# Patient Record
Sex: MALE | Race: WHITE | NOT HISPANIC OR LATINO | Employment: OTHER | URBAN - METROPOLITAN AREA
[De-identification: names, ages, dates, MRNs, and addresses within clinical notes are randomized per-mention and may not be internally consistent; named-entity substitution may affect disease eponyms.]

---

## 2019-09-23 ENCOUNTER — OFFICE VISIT (OUTPATIENT)
Dept: WOUND CARE | Facility: HOSPITAL | Age: 79
End: 2019-09-23
Attending: SURGERY
Payer: MEDICARE

## 2019-09-23 VITALS
HEART RATE: 66 BPM | SYSTOLIC BLOOD PRESSURE: 168 MMHG | TEMPERATURE: 98 F | RESPIRATION RATE: 20 BRPM | DIASTOLIC BLOOD PRESSURE: 73 MMHG

## 2019-09-23 DIAGNOSIS — I87.332 CHRONIC VENOUS HYPERTENSION (IDIOPATHIC) WITH ULCER AND INFLAMMATION OF LEFT LOWER EXTREMITY: Primary | ICD-10-CM

## 2019-09-23 DIAGNOSIS — L97.322 NON-PRESSURE CHRONIC ULCER OF LEFT ANKLE WITH FAT LAYER EXPOSED: ICD-10-CM

## 2019-09-23 PROBLEM — L97.929 VENOUS ULCER OF LEFT LOWER EXTREMITY WITH VARICOSE VEINS: Status: ACTIVE | Noted: 2019-09-23

## 2019-09-23 PROBLEM — I83.029 VENOUS ULCER OF LEFT LOWER EXTREMITY WITH VARICOSE VEINS: Status: ACTIVE | Noted: 2019-09-23

## 2019-09-23 PROCEDURE — 99499 UNLISTED E&M SERVICE: CPT | Mod: ,,, | Performed by: SURGERY

## 2019-09-23 PROCEDURE — 99499 NO LOS: ICD-10-PCS | Mod: ,,, | Performed by: SURGERY

## 2019-09-23 PROCEDURE — 27201912 HC WOUND CARE DEBRIDEMENT SUPPLIES

## 2019-09-23 PROCEDURE — 99204 OFFICE O/P NEW MOD 45 MIN: CPT | Mod: 25

## 2019-09-23 PROCEDURE — 11042 DBRDMT SUBQ TIS 1ST 20SQCM/<: CPT

## 2019-09-23 RX ORDER — MULTIVITAMIN
1 TABLET ORAL DAILY
COMMUNITY

## 2019-09-23 RX ORDER — LISINOPRIL 10 MG/1
10 TABLET ORAL DAILY
COMMUNITY

## 2019-09-23 RX ORDER — CLOPIDOGREL BISULFATE 75 MG/1
75 TABLET ORAL DAILY
COMMUNITY

## 2019-09-23 RX ORDER — SIMVASTATIN 40 MG/1
40 TABLET, FILM COATED ORAL NIGHTLY
COMMUNITY

## 2019-09-23 NOTE — PROGRESS NOTES
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  Patient states the wound has been present for several weeks.  Patient says he has had previous ulcers on the right lower extremity.  Patient denies any previous history of a deep vein thrombosis.  Patient denies any lower extremity swelling. Patient says he has been putting over-the-counter cream on the wound for the past several weeks.  Patient denies any significant drainage.  He denies any significant pain fever or chills.  Patient gives a history of blockages in the lower extremities.  Patient says he had a stent placed in the right lower extremity February of 2019.  Patient does smoke daily.                 History:    Past Medical History:   Diagnosis Date    History of blood clots     right leg    Hyperlipidemia     Hypertension     Peripheral vascular disease        Past Surgical History:   Procedure Laterality Date    ANGIOPLASTY      EYE SURGERY      Left arm surgery      Right posterior leg skin graft         Family History   Problem Relation Age of Onset    Diabetes Mother     Heart disease Father     Diabetes Sister     Stroke Sister     Heart disease Brother         reports that he has been smoking cigarettes. He has been smoking about 0.25 packs per day. He has never used smokeless tobacco. He reports that he drinks alcohol. He reports that he does not use drugs.    has a current medication list which includes the following prescription(s): clopidogrel, lisinopril, multivitamin, and simvastatin.    Allergies:  Patient has no known allergies.    Review of Systems:  ROS      Vitals:    09/23/19 0905   BP: (!) 168/73   Pulse: 66   Resp: 20   Temp: 97.7 °F (36.5 °C)   TempSrc: Temporal          BMI:  There is no height or weight on file to calculate BMI.    Physical Exam:  Physical Exam  Well developed well nourished male he sitting up in bed he is in no acute distress.  Heart is regular.  Lungs are clear.  Abdomen is soft no distention hernias or masses.  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  He has numerous spider veins and some small varicosities bilaterally. He has evidence of a healed ulcer at the right medial malleolus.  He has ulceration of the left medial malleolus.  There is significant slough present.  There is no sign of infection.  Patient has no hair growth below the upper calf.  He has palpable posterior tibial pulses bilaterally.  He has no palpable dorsalis pedis pulses. Popliteal and femoral pulses are normal.  A1C:  No results for input(s): HGBA1C in the last 2160 hours.  BMP:  No results for input(s): GLU, NA, K, CL, CO2, BUN, CREATININE, CALCIUM, MG in the last 2160 hours.   CBC:  No results for input(s): WBC, RBC, HGB, HCT, PLT, MCV, MCH, MCHC in the last 2160 hours.  CMP:  No results for input(s): GLU, CALCIUM, ALBUMIN, PROT, NA, K, CO2, CL, BUN, ALKPHOS, ALT, AST, BILITOT in the last 2160 hours.    Invalid input(s): CREATININ  PREALBUMIN:  No results for input(s): PREALBUMIN in the last 2160 hours.  WOUND CULTURES:  No results for input(s): LABAERO in the last 2160 hours.        Plan:  See Wound Docs note for plan and follow up.  Will implement Santyl to the ulcer.  Will need to obtain records from [facility] regarding previous vascular intervention.  Will also order lower extremity arterial studies.  Will also or lower extremity venous duplex to evaluate for venous reflux.  Treatment plan discussed with patient.      Travis CROCKETT Marino [facility]

## 2019-09-30 ENCOUNTER — OFFICE VISIT (OUTPATIENT)
Dept: WOUND CARE | Facility: HOSPITAL | Age: 79
End: 2019-09-30
Attending: SURGERY
Payer: MEDICARE

## 2019-09-30 VITALS
TEMPERATURE: 98 F | DIASTOLIC BLOOD PRESSURE: 80 MMHG | HEART RATE: 71 BPM | SYSTOLIC BLOOD PRESSURE: 155 MMHG | RESPIRATION RATE: 18 BRPM

## 2019-09-30 DIAGNOSIS — I87.332 CHRONIC VENOUS HYPERTENSION (IDIOPATHIC) WITH ULCER AND INFLAMMATION OF LEFT LOWER EXTREMITY: ICD-10-CM

## 2019-09-30 DIAGNOSIS — L97.929 VENOUS ULCER OF LEFT LOWER EXTREMITY WITH VARICOSE VEINS: ICD-10-CM

## 2019-09-30 DIAGNOSIS — L97.322 NON-PRESSURE CHRONIC ULCER OF LEFT ANKLE WITH FAT LAYER EXPOSED: ICD-10-CM

## 2019-09-30 DIAGNOSIS — I83.029 VENOUS ULCER OF LEFT LOWER EXTREMITY WITH VARICOSE VEINS: ICD-10-CM

## 2019-09-30 PROCEDURE — 11042 DBRDMT SUBQ TIS 1ST 20SQCM/<: CPT

## 2019-09-30 PROCEDURE — 27201912 HC WOUND CARE DEBRIDEMENT SUPPLIES

## 2019-09-30 PROCEDURE — 99499 UNLISTED E&M SERVICE: CPT | Mod: ,,, | Performed by: SURGERY

## 2019-09-30 PROCEDURE — 99499 NO LOS: ICD-10-PCS | Mod: ,,, | Performed by: SURGERY

## 2019-09-30 NOTE — PROGRESS NOTES
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  Patient states the wound has been present for several weeks.  Patient says he has had previous ulcers on the right lower extremity.  Patient denies any previous history of a deep vein thrombosis.  Patient denies any lower extremity swelling. Patient says he has been putting over-the-counter cream on the wound for the past several weeks.  Patient denies any significant drainage.  He denies any significant pain fever or chills.  Patient gives a history of blockages in the lower extremities.  Patient says he had a stent placed in the right lower extremity February of 2019.  Patient does smoke daily.  SYLVIA is 0.93 on the left lower extremity.  Patient is to see Dr. Barnes for further evaluation of his lower extremity arterial circulation.  Based on his SYLVIA, anterior circulation is adequate for compression.         Current Assessment & Plan     Two layer compression initiated.         Non-pressure chronic ulcer of left ankle with fat layer exposed    Current Assessment & Plan     Debridement to remove nonviable tissue.  Continue Santyl.         Chronic venous hypertension (idiopathic) with ulcer and inflammation of left lower extremity    Overview     Patient with long-time history of chronic venous hypertension and prior venous ulcerations.  He has utilized stockings in the past but has not utilized stockings recently.               See wound doc progress notes. Documents will be scanned.        Arvin Crowe  [facility]

## 2019-10-03 ENCOUNTER — CLINICAL SUPPORT (OUTPATIENT)
Dept: WOUND CARE | Facility: HOSPITAL | Age: 79
End: 2019-10-03
Attending: SURGERY
Payer: MEDICARE

## 2019-10-03 VITALS
RESPIRATION RATE: 20 BRPM | TEMPERATURE: 98 F | SYSTOLIC BLOOD PRESSURE: 148 MMHG | DIASTOLIC BLOOD PRESSURE: 76 MMHG | HEART RATE: 74 BPM

## 2019-10-03 DIAGNOSIS — L97.322 NON-PRESSURE CHRONIC ULCER OF LEFT ANKLE WITH FAT LAYER EXPOSED: ICD-10-CM

## 2019-10-03 PROCEDURE — 29581 APPL MULTLAYER CMPRN SYS LEG: CPT | Mod: LT

## 2019-10-07 ENCOUNTER — OFFICE VISIT (OUTPATIENT)
Dept: WOUND CARE | Facility: HOSPITAL | Age: 79
End: 2019-10-07
Attending: SURGERY
Payer: MEDICARE

## 2019-10-07 VITALS — RESPIRATION RATE: 18 BRPM | SYSTOLIC BLOOD PRESSURE: 134 MMHG | HEART RATE: 75 BPM | DIASTOLIC BLOOD PRESSURE: 72 MMHG

## 2019-10-07 DIAGNOSIS — I87.332 CHRONIC VENOUS HYPERTENSION (IDIOPATHIC) WITH ULCER AND INFLAMMATION OF LEFT LOWER EXTREMITY: ICD-10-CM

## 2019-10-07 PROCEDURE — 99499 UNLISTED E&M SERVICE: CPT | Mod: ,,, | Performed by: SURGERY

## 2019-10-07 PROCEDURE — 27201912 HC WOUND CARE DEBRIDEMENT SUPPLIES

## 2019-10-07 PROCEDURE — 99499 NO LOS: ICD-10-PCS | Mod: ,,, | Performed by: SURGERY

## 2019-10-07 PROCEDURE — 11042 DBRDMT SUBQ TIS 1ST 20SQCM/<: CPT

## 2019-10-07 NOTE — PROGRESS NOTES
[facility]  Wound Care  Progress Note    Problem List Items Addressed This Visit     Chronic venous hypertension (idiopathic) with ulcer and inflammation of left lower extremity    Overview     Patient with long-time history of chronic venous hypertension and prior venous ulcerations.  He has utilized stockings in the past but has not utilized stockings recently.         Current Assessment & Plan     Wound is improved since admission to the Wound Care Center.  There is still moderate amount of slough present.  There are small areas of granulation tissue present at the wound edges.  Patient is scheduled for arterial studies along with duplex ultrasound of the lower extremity veins.  Will continue Santyl and sharp debridement is needed               See wound doc progress notes. Documents will be scanned.        Travis Galvan  [facility]

## 2019-10-07 NOTE — ASSESSMENT & PLAN NOTE
Wound is improved since admission to the Wound Care Center.  There is still moderate amount of slough present.  There are small areas of granulation tissue present at the wound edges.  Patient is scheduled for arterial studies along with duplex ultrasound of the lower extremity veins.  Will continue Santyl and sharp debridement is needed

## 2019-10-10 ENCOUNTER — CLINICAL SUPPORT (OUTPATIENT)
Dept: WOUND CARE | Facility: HOSPITAL | Age: 79
End: 2019-10-10
Attending: SURGERY
Payer: MEDICARE

## 2019-10-10 VITALS
HEART RATE: 76 BPM | DIASTOLIC BLOOD PRESSURE: 82 MMHG | RESPIRATION RATE: 18 BRPM | TEMPERATURE: 98 F | SYSTOLIC BLOOD PRESSURE: 142 MMHG

## 2019-10-10 DIAGNOSIS — L97.929 VENOUS ULCER OF LEFT LOWER EXTREMITY WITH VARICOSE VEINS: ICD-10-CM

## 2019-10-10 DIAGNOSIS — I83.029 VENOUS ULCER OF LEFT LOWER EXTREMITY WITH VARICOSE VEINS: ICD-10-CM

## 2019-10-10 PROCEDURE — 29581 APPL MULTLAYER CMPRN SYS LEG: CPT | Mod: LT

## 2019-10-14 ENCOUNTER — OFFICE VISIT (OUTPATIENT)
Dept: WOUND CARE | Facility: HOSPITAL | Age: 79
End: 2019-10-14
Attending: SURGERY
Payer: MEDICARE

## 2019-10-14 VITALS
HEART RATE: 84 BPM | RESPIRATION RATE: 20 BRPM | SYSTOLIC BLOOD PRESSURE: 127 MMHG | TEMPERATURE: 97 F | DIASTOLIC BLOOD PRESSURE: 66 MMHG

## 2019-10-14 DIAGNOSIS — I83.029 VENOUS ULCER OF LEFT LOWER EXTREMITY WITH VARICOSE VEINS: ICD-10-CM

## 2019-10-14 DIAGNOSIS — L97.322 NON-PRESSURE CHRONIC ULCER OF LEFT ANKLE WITH FAT LAYER EXPOSED: Primary | ICD-10-CM

## 2019-10-14 DIAGNOSIS — L02.416 ABSCESS OF LEFT THIGH: ICD-10-CM

## 2019-10-14 DIAGNOSIS — L97.929 VENOUS ULCER OF LEFT LOWER EXTREMITY WITH VARICOSE VEINS: ICD-10-CM

## 2019-10-14 DIAGNOSIS — I87.332 CHRONIC VENOUS HYPERTENSION (IDIOPATHIC) WITH ULCER AND INFLAMMATION OF LEFT LOWER EXTREMITY: ICD-10-CM

## 2019-10-14 PROCEDURE — 11042 DBRDMT SUBQ TIS 1ST 20SQCM/<: CPT

## 2019-10-14 PROCEDURE — 87186 SC STD MICRODIL/AGAR DIL: CPT

## 2019-10-14 PROCEDURE — 10060 I&D ABSCESS SIMPLE/SINGLE: CPT

## 2019-10-14 PROCEDURE — 87075 CULTR BACTERIA EXCEPT BLOOD: CPT

## 2019-10-14 PROCEDURE — 87077 CULTURE AEROBIC IDENTIFY: CPT

## 2019-10-14 PROCEDURE — 27201912 HC WOUND CARE DEBRIDEMENT SUPPLIES

## 2019-10-14 PROCEDURE — 99499 UNLISTED E&M SERVICE: CPT | Mod: ,,, | Performed by: SURGERY

## 2019-10-14 PROCEDURE — 87070 CULTURE OTHR SPECIMN AEROBIC: CPT

## 2019-10-14 PROCEDURE — 99499 NO LOS: ICD-10-PCS | Mod: ,,, | Performed by: SURGERY

## 2019-10-14 NOTE — ASSESSMENT & PLAN NOTE
Incision and drainage was performed.  Packing placed. Culture was taken.  Begin Bactrim DS b.i.d. for 10 days.  MRSA is suspected.

## 2019-10-14 NOTE — ASSESSMENT & PLAN NOTE
Periwound is macerated.  Continue debridement to maintain active phase wound healing.  Continue compression.  Discontinue Santyl.

## 2019-10-17 ENCOUNTER — CLINICAL SUPPORT (OUTPATIENT)
Dept: WOUND CARE | Facility: HOSPITAL | Age: 79
End: 2019-10-17
Attending: SURGERY
Payer: MEDICARE

## 2019-10-17 VITALS
TEMPERATURE: 98 F | RESPIRATION RATE: 18 BRPM | DIASTOLIC BLOOD PRESSURE: 84 MMHG | HEART RATE: 88 BPM | SYSTOLIC BLOOD PRESSURE: 130 MMHG

## 2019-10-17 DIAGNOSIS — I87.332 CHRONIC VENOUS HYPERTENSION (IDIOPATHIC) WITH ULCER AND INFLAMMATION OF LEFT LOWER EXTREMITY: ICD-10-CM

## 2019-10-17 LAB — BACTERIA SPEC AEROBE CULT: ABNORMAL

## 2019-10-17 PROCEDURE — 29581 APPL MULTLAYER CMPRN SYS LEG: CPT | Mod: LT

## 2019-10-18 NOTE — PROGRESS NOTES
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  Patient states the wound has been present for several weeks.  Patient says he has had previous ulcers on the right lower extremity.  Patient denies any previous history of a deep vein thrombosis.  Patient denies any lower extremity swelling. Patient says he has been putting over-the-counter cream on the wound for the past several weeks.  Patient denies any significant drainage.  He denies any significant pain fever or chills.  Patient gives a history of blockages in the lower extremities.  Patient says he had a stent placed in the right lower extremity February of 2019.  Patient does smoke daily.  SYLVIA is 0.93 on the left lower extremity.  Patient is to see Dr. Barnes for further evaluation of his lower extremity arterial circulation.  Based on his SYLVIA, anterior circulation is adequate for compression.         Non-pressure chronic ulcer of left ankle with fat layer exposed - Primary    Current Assessment & Plan     Periwound is macerated.  Continue debridement to maintain active phase wound healing.  Continue compression.  Discontinue Santyl.             Chronic venous hypertension (idiopathic) with ulcer and inflammation of left lower extremity    Overview     Patient with long-time history of chronic venous hypertension and prior venous ulcerations.  He has utilized stockings in the past but has not utilized stockings recently.            ID    Abscess of left thigh    Overview     Patient presented 10/14/2019 with a several day history of a red tender area on the left anterior thigh.  There has been slight drainage.   There was a 3 x 3 cm area of erythema and tenderness of the left anterior thigh.  Incision  and drainage was performed.           Current Assessment & Plan     Incision and drainage was performed.  Packing placed. Culture was taken.  Begin Bactrim DS b.i.d. for 10 days.  MRSA is suspected.         Relevant Orders    CULTURE, AEROBIC  (SPECIFY SOURCE) (Completed)    CULTURE, ANAEROBE (Completed)          See wound doc progress notes. Documents will be scanned.        Arvin CHU Hebert [facility]

## 2019-10-21 ENCOUNTER — OFFICE VISIT (OUTPATIENT)
Dept: WOUND CARE | Facility: HOSPITAL | Age: 79
End: 2019-10-21
Attending: SURGERY
Payer: MEDICARE

## 2019-10-21 VITALS
RESPIRATION RATE: 20 BRPM | TEMPERATURE: 98 F | HEART RATE: 85 BPM | DIASTOLIC BLOOD PRESSURE: 67 MMHG | SYSTOLIC BLOOD PRESSURE: 133 MMHG

## 2019-10-21 DIAGNOSIS — L02.416 ABSCESS OF LEFT THIGH: ICD-10-CM

## 2019-10-21 DIAGNOSIS — L97.929 VENOUS ULCER OF LEFT LOWER EXTREMITY WITH VARICOSE VEINS: ICD-10-CM

## 2019-10-21 DIAGNOSIS — I83.029 VENOUS ULCER OF LEFT LOWER EXTREMITY WITH VARICOSE VEINS: ICD-10-CM

## 2019-10-21 LAB — BACTERIA SPEC ANAEROBE CULT: NORMAL

## 2019-10-21 PROCEDURE — 99499 UNLISTED E&M SERVICE: CPT | Mod: ,,, | Performed by: SURGERY

## 2019-10-21 PROCEDURE — 99499 NO LOS: ICD-10-PCS | Mod: ,,, | Performed by: SURGERY

## 2019-10-21 PROCEDURE — 27201912 HC WOUND CARE DEBRIDEMENT SUPPLIES

## 2019-10-21 PROCEDURE — 11042 DBRDMT SUBQ TIS 1ST 20SQCM/<: CPT

## 2019-10-21 NOTE — ASSESSMENT & PLAN NOTE
Wound consists mostly of slough.  There are few areas of granulation tissue.  Wound does bleed with debridement.  Continue Santyl for enzymatic debridement.  Continue sharp debridement is needed.  Continue compression therapy.

## 2019-10-21 NOTE — PROGRESS NOTES
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  Patient states the wound has been present for several weeks.  Patient says he has had previous ulcers on the right lower extremity.  Patient denies any previous history of a deep vein thrombosis.  Patient denies any lower extremity swelling. Patient says he has been putting over-the-counter cream on the wound for the past several weeks.  Patient denies any significant drainage.  He denies any significant pain fever or chills.  Patient gives a history of blockages in the lower extremities.  Patient says he had a stent placed in the right lower extremity February of 2019.  Patient does smoke daily.  SYLVIA is 0.93 on the left lower extremity.  Patient is to see Dr. Barnes for further evaluation of his lower extremity arterial circulation.  Based on his SYLVIA, anterior circulation is adequate for compression.         Current Assessment & Plan     Wound consists mostly of slough.  There are few areas of granulation tissue.  Wound does bleed with debridement.  Continue Santyl for enzymatic debridement.  Continue sharp debridement is needed.  Continue compression therapy.         Abscess of left thigh    Overview     Patient presented 10/14/2019 with a several day history of a red tender area on the left anterior thigh.  There has been slight drainage.   There was a 3 x 3 cm area of erythema and tenderness of the left anterior thigh.  Incision and drainage was performed.           Current Assessment & Plan     No sign of soft tissue infection.  Continue current wound management               See wound doc progress notes. Documents will be scanned.        Travis Galvan  [facility]

## 2019-10-24 ENCOUNTER — CLINICAL SUPPORT (OUTPATIENT)
Dept: WOUND CARE | Facility: HOSPITAL | Age: 79
End: 2019-10-24
Attending: SURGERY
Payer: MEDICARE

## 2019-10-24 VITALS
RESPIRATION RATE: 20 BRPM | HEART RATE: 63 BPM | DIASTOLIC BLOOD PRESSURE: 70 MMHG | TEMPERATURE: 98 F | SYSTOLIC BLOOD PRESSURE: 121 MMHG

## 2019-10-24 DIAGNOSIS — I87.332 CHRONIC VENOUS HYPERTENSION (IDIOPATHIC) WITH ULCER AND INFLAMMATION OF LEFT LOWER EXTREMITY: Primary | ICD-10-CM

## 2019-10-24 PROCEDURE — 29581 APPL MULTLAYER CMPRN SYS LEG: CPT | Mod: LT

## 2019-10-28 ENCOUNTER — OFFICE VISIT (OUTPATIENT)
Dept: WOUND CARE | Facility: HOSPITAL | Age: 79
End: 2019-10-28
Attending: SURGERY
Payer: MEDICARE

## 2019-10-28 VITALS
DIASTOLIC BLOOD PRESSURE: 66 MMHG | SYSTOLIC BLOOD PRESSURE: 134 MMHG | RESPIRATION RATE: 20 BRPM | TEMPERATURE: 98 F | HEART RATE: 90 BPM

## 2019-10-28 DIAGNOSIS — A49.02 MRSA (METHICILLIN RESISTANT STAPHYLOCOCCUS AUREUS) INFECTION: ICD-10-CM

## 2019-10-28 DIAGNOSIS — L97.322 NON-PRESSURE CHRONIC ULCER OF LEFT ANKLE WITH FAT LAYER EXPOSED: ICD-10-CM

## 2019-10-28 DIAGNOSIS — L02.416 ABSCESS OF LEFT THIGH: ICD-10-CM

## 2019-10-28 DIAGNOSIS — I83.029 VENOUS ULCER OF LEFT LOWER EXTREMITY WITH VARICOSE VEINS: Primary | ICD-10-CM

## 2019-10-28 DIAGNOSIS — L97.929 VENOUS ULCER OF LEFT LOWER EXTREMITY WITH VARICOSE VEINS: Primary | ICD-10-CM

## 2019-10-28 DIAGNOSIS — I87.332 CHRONIC VENOUS HYPERTENSION (IDIOPATHIC) WITH ULCER AND INFLAMMATION OF LEFT LOWER EXTREMITY: ICD-10-CM

## 2019-10-28 PROCEDURE — 11042 DBRDMT SUBQ TIS 1ST 20SQCM/<: CPT

## 2019-10-28 PROCEDURE — 99499 UNLISTED E&M SERVICE: CPT | Mod: ,,, | Performed by: SURGERY

## 2019-10-28 PROCEDURE — 99499 NO LOS: ICD-10-PCS | Mod: ,,, | Performed by: SURGERY

## 2019-10-28 PROCEDURE — 27201912 HC WOUND CARE DEBRIDEMENT SUPPLIES

## 2019-10-28 NOTE — ASSESSMENT & PLAN NOTE
Continue compression.  Will try compressing with the patient's prescribe stocking and monitor the wound closely.

## 2019-10-28 NOTE — ASSESSMENT & PLAN NOTE
Wound is improving.  It is decreased in size.  Continue debridement to remove nonviable tissue.  Continue Santyl.

## 2019-10-28 NOTE — PROGRESS NOTES
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  Patient states the wound has been present for several weeks.  Patient says he has had previous ulcers on the right lower extremity.  Patient denies any previous history of a deep vein thrombosis.  Patient denies any lower extremity swelling. Patient says he has been putting over-the-counter cream on the wound for the past several weeks.  Patient denies any significant drainage.  He denies any significant pain fever or chills.  Patient gives a history of blockages in the lower extremities.  Patient says he had a stent placed in the right lower extremity February of 2019.  Patient does smoke daily.  SYLVIA is 0.93 on the left lower extremity.  Patient is to see Dr. Barnes for further evaluation of his lower extremity arterial circulation.  Based on his SYLVIA, anterior circulation is adequate for compression.         Current Assessment & Plan     Continue compression.  Will try compressing with the patient's prescribe stocking and monitor the wound closely.         Non-pressure chronic ulcer of left ankle with fat layer exposed    Current Assessment & Plan     Wound is improving.  It is decreased in size.  Continue debridement to remove nonviable tissue.  Continue Santyl.         Chronic venous hypertension (idiopathic) with ulcer and inflammation of left lower extremity    Overview     Patient with long-time history of chronic venous hypertension and prior venous ulcerations.  He has utilized stockings in the past but has not utilized stockings recently.            ID    Abscess of left thigh    Overview     Patient presented 10/14/2019 with a several day history of a red tender area on the left anterior  thigh.  There has been slight drainage.   There was a 3 x 3 cm area of erythema and tenderness of the left anterior thigh.  Incision and drainage was performed.           Current Assessment & Plan     Wound is improving.  No sign of infection.         MRSA (methicillin resistant Staphylococcus aureus) infection    Overview     Left thigh abscess revealed MRSA.  He has been treated with Bactrim.         Current Assessment & Plan     Infection resolved               See wound doc progress notes. Documents will be scanned.        Arvin CHU Hebert [facility]

## 2019-11-04 ENCOUNTER — OFFICE VISIT (OUTPATIENT)
Dept: WOUND CARE | Facility: HOSPITAL | Age: 79
End: 2019-11-04
Attending: SURGERY
Payer: MEDICARE

## 2019-11-04 VITALS
TEMPERATURE: 98 F | RESPIRATION RATE: 18 BRPM | DIASTOLIC BLOOD PRESSURE: 60 MMHG | HEART RATE: 84 BPM | SYSTOLIC BLOOD PRESSURE: 119 MMHG

## 2019-11-04 DIAGNOSIS — I87.332 CHRONIC VENOUS HYPERTENSION (IDIOPATHIC) WITH ULCER AND INFLAMMATION OF LEFT LOWER EXTREMITY: ICD-10-CM

## 2019-11-04 DIAGNOSIS — L02.416 ABSCESS OF LEFT THIGH: ICD-10-CM

## 2019-11-04 PROCEDURE — 27201912 HC WOUND CARE DEBRIDEMENT SUPPLIES

## 2019-11-04 PROCEDURE — 11042 DBRDMT SUBQ TIS 1ST 20SQCM/<: CPT

## 2019-11-04 PROCEDURE — 99499 UNLISTED E&M SERVICE: CPT | Mod: ,,, | Performed by: SURGERY

## 2019-11-04 PROCEDURE — 99499 NO LOS: ICD-10-PCS | Mod: ,,, | Performed by: SURGERY

## 2019-11-04 NOTE — PROGRESS NOTES
[facility]  Wound Care  Progress Note    Problem List Items Addressed This Visit     Chronic venous hypertension (idiopathic) with ulcer and inflammation of left lower extremity    Overview     Patient with long-time history of chronic venous hypertension and prior venous ulcerations.  He has utilized stockings in the past but has not utilized stockings recently.         Current Assessment & Plan     Wound improved.  Islands of granulation tissue. There is still fair amount of slough and nonviable tissue present.  Patient had ultrasound and lab work performed last week.  Will obtain results.  Continue current management.         Abscess of left thigh    Overview     Patient presented 10/14/2019 with a several day history of a red tender area on the left anterior thigh.  There has been slight drainage.   There was a 3 x 3 cm area of erythema and tenderness of the left anterior thigh.  Incision and drainage was performed at that time.           Current Assessment & Plan     Wound nearly resolved.               See wound doc progress notes. Documents will be scanned.        Travis Galvan  [facility]

## 2019-11-04 NOTE — ASSESSMENT & PLAN NOTE
Wound improved.  Islands of granulation tissue. There is still fair amount of slough and nonviable tissue present.  Patient had ultrasound and lab work performed last week.  Will obtain results.  Continue current management.

## 2019-11-11 ENCOUNTER — OFFICE VISIT (OUTPATIENT)
Dept: WOUND CARE | Facility: HOSPITAL | Age: 79
End: 2019-11-11
Attending: SURGERY
Payer: MEDICARE

## 2019-11-11 VITALS
SYSTOLIC BLOOD PRESSURE: 145 MMHG | RESPIRATION RATE: 20 BRPM | HEART RATE: 68 BPM | TEMPERATURE: 98 F | DIASTOLIC BLOOD PRESSURE: 73 MMHG

## 2019-11-11 DIAGNOSIS — L97.322 NON-PRESSURE CHRONIC ULCER OF LEFT ANKLE WITH FAT LAYER EXPOSED: Primary | ICD-10-CM

## 2019-11-11 DIAGNOSIS — L02.416 ABSCESS OF LEFT THIGH: ICD-10-CM

## 2019-11-11 DIAGNOSIS — I83.029 VENOUS ULCER OF LEFT LOWER EXTREMITY WITH VARICOSE VEINS: ICD-10-CM

## 2019-11-11 DIAGNOSIS — L97.929 VENOUS ULCER OF LEFT LOWER EXTREMITY WITH VARICOSE VEINS: ICD-10-CM

## 2019-11-11 DIAGNOSIS — L97.122 NON-PRESSURE CHRONIC ULCER OF LEFT THIGH WITH FAT LAYER EXPOSED: ICD-10-CM

## 2019-11-11 DIAGNOSIS — I87.332 CHRONIC VENOUS HYPERTENSION (IDIOPATHIC) WITH ULCER AND INFLAMMATION OF LEFT LOWER EXTREMITY: ICD-10-CM

## 2019-11-11 PROCEDURE — 11042 DBRDMT SUBQ TIS 1ST 20SQCM/<: CPT

## 2019-11-11 PROCEDURE — 99499 NO LOS: ICD-10-PCS | Mod: ,,, | Performed by: SURGERY

## 2019-11-11 PROCEDURE — 27201912 HC WOUND CARE DEBRIDEMENT SUPPLIES

## 2019-11-11 PROCEDURE — 99499 UNLISTED E&M SERVICE: CPT | Mod: ,,, | Performed by: SURGERY

## 2019-11-11 NOTE — ASSESSMENT & PLAN NOTE
Patient has increased dermatitis in the periwound region.  Begin clobetasol.  Continue stocking use to control edema.

## 2019-11-11 NOTE — PROGRESS NOTES
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  Patient states the wound has been present for several weeks.  Patient says he has had previous ulcers on the right lower extremity.  Patient denies any previous history of a deep vein thrombosis.  Patient denies any lower extremity swelling. Patient says he has been putting over-the-counter cream on the wound for the past several weeks.  Patient denies any significant drainage.  He denies any significant pain fever or chills.  Patient gives a history of blockages in the lower extremities.  Patient says he had a stent placed in the right lower extremity February of 2019.  Patient does smoke daily.  SYLVIA is 0.93 on the left lower extremity. Based on his SYLVIA, anterior circulation is adequate for compression.         Non-pressure chronic ulcer of left ankle with fat layer exposed - Primary    Chronic venous hypertension (idiopathic) with ulcer and inflammation of left lower extremity    Overview     Patient with long-time history of chronic venous hypertension and prior venous ulcerations.  He has utilized stockings in the past but has not utilized stockings recently.         Current Assessment & Plan     Patient has increased dermatitis in the periwound region.  Begin clobetasol.  Continue stocking use to control edema.         Non-pressure chronic ulcer of left thigh with fat layer exposed    Overview     Patient noted a wound on his left posterior thigh 2 weeks ago from 11/11/2019 when he noted pain when sitting on a bench.  At the time, he noted a wet area and subsequent wound. He denies burning his skin.  He has not really sure of the origin.  He has some tenderness when the area is touched.         Current  Assessment & Plan     Wound had small amount of nonviable tissue.  Debridement was performed.  Begin Mepilex Ag foam.            ID    Abscess of left thigh    Overview     Patient presented 10/14/2019 with a several day history of a red tender area on the left anterior thigh.  There has been slight drainage.   There was a 3 x 3 cm area of erythema and tenderness of the left anterior thigh.  Incision and drainage was performed at that time.           Current Assessment & Plan     Abscess resolved and wound healed.               See wound doc progress notes. Documents will be scanned.        Arvin Crowe  [facility]

## 2019-11-18 ENCOUNTER — OFFICE VISIT (OUTPATIENT)
Dept: WOUND CARE | Facility: HOSPITAL | Age: 79
End: 2019-11-18
Attending: SURGERY
Payer: MEDICARE

## 2019-11-18 VITALS — DIASTOLIC BLOOD PRESSURE: 88 MMHG | SYSTOLIC BLOOD PRESSURE: 134 MMHG | RESPIRATION RATE: 20 BRPM | HEART RATE: 70 BPM

## 2019-11-18 DIAGNOSIS — I87.332 CHRONIC VENOUS HYPERTENSION (IDIOPATHIC) WITH ULCER AND INFLAMMATION OF LEFT LOWER EXTREMITY: ICD-10-CM

## 2019-11-18 PROCEDURE — 99499 UNLISTED E&M SERVICE: CPT | Mod: ,,, | Performed by: SURGERY

## 2019-11-18 PROCEDURE — 27201912 HC WOUND CARE DEBRIDEMENT SUPPLIES

## 2019-11-18 PROCEDURE — 99499 NO LOS: ICD-10-PCS | Mod: ,,, | Performed by: SURGERY

## 2019-11-18 PROCEDURE — 11042 DBRDMT SUBQ TIS 1ST 20SQCM/<: CPT

## 2019-11-18 NOTE — PROGRESS NOTES
[facility]  Wound Care  Progress Note    Problem List Items Addressed This Visit     Chronic venous hypertension (idiopathic) with ulcer and inflammation of left lower extremity    Overview     Patient with long-time history of chronic venous hypertension and prior venous ulcerations.  He has utilized stockings in the past but has not utilized stockings recently.         Current Assessment & Plan     Decreased drainage. Edema control.  Wound bed starting to granulate.  Continue current management               See wound doc progress notes. Documents will be scanned.        Travis Galvan  [facility]

## 2019-11-25 ENCOUNTER — OFFICE VISIT (OUTPATIENT)
Dept: WOUND CARE | Facility: HOSPITAL | Age: 79
End: 2019-11-25
Attending: SURGERY
Payer: MEDICARE

## 2019-11-25 VITALS
DIASTOLIC BLOOD PRESSURE: 68 MMHG | SYSTOLIC BLOOD PRESSURE: 122 MMHG | RESPIRATION RATE: 20 BRPM | TEMPERATURE: 98 F | HEART RATE: 74 BPM

## 2019-11-25 DIAGNOSIS — I83.029 VENOUS ULCER OF LEFT LOWER EXTREMITY WITH VARICOSE VEINS: ICD-10-CM

## 2019-11-25 DIAGNOSIS — L97.929 VENOUS ULCER OF LEFT LOWER EXTREMITY WITH VARICOSE VEINS: ICD-10-CM

## 2019-11-25 DIAGNOSIS — L97.122 NON-PRESSURE CHRONIC ULCER OF LEFT THIGH WITH FAT LAYER EXPOSED: ICD-10-CM

## 2019-11-25 DIAGNOSIS — L97.322 NON-PRESSURE CHRONIC ULCER OF LEFT ANKLE WITH FAT LAYER EXPOSED: Primary | ICD-10-CM

## 2019-11-25 DIAGNOSIS — I87.332 CHRONIC VENOUS HYPERTENSION (IDIOPATHIC) WITH ULCER AND INFLAMMATION OF LEFT LOWER EXTREMITY: ICD-10-CM

## 2019-11-25 PROBLEM — A49.02 MRSA (METHICILLIN RESISTANT STAPHYLOCOCCUS AUREUS) INFECTION: Status: RESOLVED | Noted: 2019-10-28 | Resolved: 2019-11-25

## 2019-11-25 PROBLEM — L02.416 ABSCESS OF LEFT THIGH: Status: RESOLVED | Noted: 2019-10-14 | Resolved: 2019-11-25

## 2019-11-25 PROCEDURE — 99499 UNLISTED E&M SERVICE: CPT | Mod: ,,, | Performed by: SURGERY

## 2019-11-25 PROCEDURE — 27201912 HC WOUND CARE DEBRIDEMENT SUPPLIES

## 2019-11-25 PROCEDURE — 99499 NO LOS: ICD-10-PCS | Mod: ,,, | Performed by: SURGERY

## 2019-11-25 PROCEDURE — 11042 DBRDMT SUBQ TIS 1ST 20SQCM/<: CPT

## 2019-11-25 NOTE — ASSESSMENT & PLAN NOTE
Wound has worsened.  Continue debridement to maintain active phase wound healing.  Continue clobetasol a miesha wound area.  Increased compression with 2 layer as stocking does not appear to be adequate.

## 2019-11-25 NOTE — PROGRESS NOTES
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  Patient states the wound has been present for several weeks.  Patient says he has had previous ulcers on the right lower extremity.  Patient denies any previous history of a deep vein thrombosis.  Patient denies any lower extremity swelling. Patient says he has been putting over-the-counter cream on the wound for the past several weeks.  Patient denies any significant drainage.  He denies any significant pain fever or chills.  Patient gives a history of blockages in the lower extremities.  Patient says he had a stent placed in the right lower extremity February of 2019.  Patient does smoke daily.  SYLVIA is 0.93 on the left lower extremity. Based on his SYLVIA, anterior circulation is adequate for compression.         Non-pressure chronic ulcer of left ankle with fat layer exposed - Primary    Current Assessment & Plan     Wound has worsened.  Continue debridement to maintain active phase wound healing.  Continue clobetasol a miesha wound area.  Increased compression with 2 layer as stocking does not appear to be adequate.         Chronic venous hypertension (idiopathic) with ulcer and inflammation of left lower extremity    Overview     Patient with long-time history of chronic venous hypertension and prior venous ulcerations.  He has utilized stockings in the past but has not utilized stockings recently.         Non-pressure chronic ulcer of left thigh with fat layer exposed    Overview     Patient noted a wound on his left posterior thigh 2 weeks ago from 11/11/2019 when he noted pain when sitting on a bench.  At the time, he noted a wet area and subsequent wound. He denies burning his skin.  He has not really sure  of the origin.  He has some tenderness when the area is touched.         Current Assessment & Plan     Wound is improving.  Continue debridement to maintain active phase wound healing.               See wound doc progress notes. Documents will be scanned.        Arvin Crowe  [facility]

## 2019-12-02 ENCOUNTER — OFFICE VISIT (OUTPATIENT)
Dept: WOUND CARE | Facility: HOSPITAL | Age: 79
End: 2019-12-02
Attending: SURGERY
Payer: MEDICARE

## 2019-12-02 VITALS
TEMPERATURE: 97 F | RESPIRATION RATE: 18 BRPM | HEART RATE: 88 BPM | SYSTOLIC BLOOD PRESSURE: 159 MMHG | DIASTOLIC BLOOD PRESSURE: 60 MMHG

## 2019-12-02 DIAGNOSIS — L97.322 NON-PRESSURE CHRONIC ULCER OF LEFT ANKLE WITH FAT LAYER EXPOSED: ICD-10-CM

## 2019-12-02 PROCEDURE — 27201912 HC WOUND CARE DEBRIDEMENT SUPPLIES

## 2019-12-02 PROCEDURE — 99499 UNLISTED E&M SERVICE: CPT | Mod: ,,, | Performed by: SURGERY

## 2019-12-02 PROCEDURE — 11042 DBRDMT SUBQ TIS 1ST 20SQCM/<: CPT

## 2019-12-02 PROCEDURE — 99499 NO LOS: ICD-10-PCS | Mod: ,,, | Performed by: SURGERY

## 2019-12-02 NOTE — PROGRESS NOTES
[facility]  Wound Care  Progress Note    Problem List Items Addressed This Visit     Non-pressure chronic ulcer of left ankle with fat layer exposed    Current Assessment & Plan     Wound is increased in size by measurements but there is significantly more granulation tissue present.  There is still fair amount of fibrous nonviable tissue present.  Patient states that the wound is very painful.  He could not tolerate the 2 layer compression and took it off.  Edema is fairly well controlled even without the to wear compression for the past several days.  Will go back to [facility] and try a compression stocking for edema control               See wound doc progress notes. Documents will be scanned.        Travis Galvan  [facility]

## 2019-12-02 NOTE — ASSESSMENT & PLAN NOTE
Wound is increased in size by measurements but there is significantly more granulation tissue present.  There is still fair amount of fibrous nonviable tissue present.  Patient states that the wound is very painful.  He could not tolerate the 2 layer compression and took it off.  Edema is fairly well controlled even without the to wear compression for the past several days.  Will go back to [facility] and try a compression stocking for edema control

## 2019-12-09 ENCOUNTER — OFFICE VISIT (OUTPATIENT)
Dept: WOUND CARE | Facility: HOSPITAL | Age: 79
End: 2019-12-09
Attending: SURGERY
Payer: MEDICARE

## 2019-12-09 VITALS
SYSTOLIC BLOOD PRESSURE: 137 MMHG | DIASTOLIC BLOOD PRESSURE: 78 MMHG | TEMPERATURE: 98 F | RESPIRATION RATE: 18 BRPM | HEART RATE: 83 BPM

## 2019-12-09 DIAGNOSIS — I83.029 VENOUS ULCER OF LEFT LOWER EXTREMITY WITH VARICOSE VEINS: ICD-10-CM

## 2019-12-09 DIAGNOSIS — L97.322 NON-PRESSURE CHRONIC ULCER OF LEFT ANKLE WITH FAT LAYER EXPOSED: Primary | ICD-10-CM

## 2019-12-09 DIAGNOSIS — L97.929 VENOUS ULCER OF LEFT LOWER EXTREMITY WITH VARICOSE VEINS: ICD-10-CM

## 2019-12-09 DIAGNOSIS — I87.332 CHRONIC VENOUS HYPERTENSION (IDIOPATHIC) WITH ULCER AND INFLAMMATION OF LEFT LOWER EXTREMITY: ICD-10-CM

## 2019-12-09 PROBLEM — L97.122 NON-PRESSURE CHRONIC ULCER OF LEFT THIGH WITH FAT LAYER EXPOSED: Status: RESOLVED | Noted: 2019-11-11 | Resolved: 2019-12-09

## 2019-12-09 PROCEDURE — 27201912 HC WOUND CARE DEBRIDEMENT SUPPLIES

## 2019-12-09 PROCEDURE — 11042 DBRDMT SUBQ TIS 1ST 20SQCM/<: CPT

## 2019-12-09 PROCEDURE — 99499 NO LOS: ICD-10-PCS | Mod: ,,, | Performed by: SURGERY

## 2019-12-09 PROCEDURE — 99499 UNLISTED E&M SERVICE: CPT | Mod: ,,, | Performed by: SURGERY

## 2019-12-09 NOTE — ASSESSMENT & PLAN NOTE
Wound has worsened.  Continue debridement to maintain active phase wound healing.  Re-initiate compression.

## 2019-12-09 NOTE — PROGRESS NOTES
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  Patient states the wound has been present for several weeks.  Patient says he has had previous ulcers on the right lower extremity.  Patient denies any previous history of a deep vein thrombosis.  Patient denies any lower extremity swelling. Patient says he has been putting over-the-counter cream on the wound for the past several weeks.  Patient denies any significant drainage.  He denies any significant pain fever or chills.  Patient gives a history of blockages in the lower extremities.  Patient says he had a stent placed in the right lower extremity February of 2019.  Patient does smoke daily.  SYLVIA is 0.93 on the left lower extremity. Based on his SYLVIA, anterior circulation is adequate for compression.         Non-pressure chronic ulcer of left ankle with fat layer exposed - Primary    Current Assessment & Plan     Wound has worsened.  Continue debridement to maintain active phase wound healing.  Re-initiate compression.         Chronic venous hypertension (idiopathic) with ulcer and inflammation of left lower extremity    Overview     Patient with long-time history of chronic venous hypertension and prior venous ulcerations.  He has utilized stockings in the past but has not utilized stockings recently.         Current Assessment & Plan     Reinitiate compression therapy with 2 layers.               See wound doc progress notes. Documents will be scanned.        Arvin Crowe  [facility]

## 2019-12-12 ENCOUNTER — CLINICAL SUPPORT (OUTPATIENT)
Dept: WOUND CARE | Facility: HOSPITAL | Age: 79
End: 2019-12-12
Attending: SURGERY
Payer: MEDICARE

## 2019-12-12 VITALS
HEART RATE: 71 BPM | SYSTOLIC BLOOD PRESSURE: 143 MMHG | TEMPERATURE: 98 F | DIASTOLIC BLOOD PRESSURE: 78 MMHG | RESPIRATION RATE: 16 BRPM

## 2019-12-12 DIAGNOSIS — I83.029 VENOUS ULCER OF LEFT LOWER EXTREMITY WITH VARICOSE VEINS: ICD-10-CM

## 2019-12-12 DIAGNOSIS — L97.929 VENOUS ULCER OF LEFT LOWER EXTREMITY WITH VARICOSE VEINS: ICD-10-CM

## 2019-12-12 DIAGNOSIS — L97.322 NON-PRESSURE CHRONIC ULCER OF LEFT ANKLE WITH FAT LAYER EXPOSED: ICD-10-CM

## 2019-12-12 PROCEDURE — 29581 APPL MULTLAYER CMPRN SYS LEG: CPT | Mod: LT

## 2019-12-16 ENCOUNTER — OFFICE VISIT (OUTPATIENT)
Dept: WOUND CARE | Facility: HOSPITAL | Age: 79
End: 2019-12-16
Attending: SURGERY
Payer: MEDICARE

## 2019-12-16 DIAGNOSIS — I83.029 VENOUS ULCER OF LEFT LOWER EXTREMITY WITH VARICOSE VEINS: ICD-10-CM

## 2019-12-16 DIAGNOSIS — L97.929 VENOUS ULCER OF LEFT LOWER EXTREMITY WITH VARICOSE VEINS: ICD-10-CM

## 2019-12-16 PROCEDURE — 99499 UNLISTED E&M SERVICE: CPT | Mod: ,,, | Performed by: SURGERY

## 2019-12-16 PROCEDURE — 11042 DBRDMT SUBQ TIS 1ST 20SQCM/<: CPT

## 2019-12-16 PROCEDURE — 27201912 HC WOUND CARE DEBRIDEMENT SUPPLIES

## 2019-12-16 PROCEDURE — 99499 NO LOS: ICD-10-PCS | Mod: ,,, | Performed by: SURGERY

## 2019-12-16 NOTE — PROGRESS NOTES
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  Patient states the wound has been present for several weeks.  Patient says he has had previous ulcers on the right lower extremity.  Patient denies any previous history of a deep vein thrombosis.  Patient denies any lower extremity swelling. Patient says he has been putting over-the-counter cream on the wound for the past several weeks.  Patient denies any significant drainage.  He denies any significant pain fever or chills.  Patient gives a history of blockages in the lower extremities.  Patient says he had a stent placed in the right lower extremity February of 2019.  Patient does smoke daily.  SYLVIA is 0.93 on the left lower extremity. Based on his SYLVIA, anterior circulation is adequate for compression.         Current Assessment & Plan     Drainage is decreased today.  Edema is fairly well controlled.  Wound bed with a moderate amount of slough and drainage.  Will change to silver alginate.  Continue compression.  Patient has appointment with [facility] this week for intervention               See wound doc progress notes. Documents will be scanned.        Travis Galvan  [facility]

## 2019-12-16 NOTE — ASSESSMENT & PLAN NOTE
Drainage is decreased today.  Edema is fairly well controlled.  Wound bed with a moderate amount of slough and drainage.  Will change to silver alginate.  Continue compression.  Patient has appointment with [facility] this week for intervention

## 2019-12-23 ENCOUNTER — OFFICE VISIT (OUTPATIENT)
Dept: WOUND CARE | Facility: HOSPITAL | Age: 79
End: 2019-12-23
Attending: SURGERY
Payer: MEDICARE

## 2019-12-23 VITALS
DIASTOLIC BLOOD PRESSURE: 79 MMHG | TEMPERATURE: 98 F | HEART RATE: 79 BPM | RESPIRATION RATE: 19 BRPM | SYSTOLIC BLOOD PRESSURE: 151 MMHG

## 2019-12-23 DIAGNOSIS — I87.332 CHRONIC VENOUS HYPERTENSION (IDIOPATHIC) WITH ULCER AND INFLAMMATION OF LEFT LOWER EXTREMITY: ICD-10-CM

## 2019-12-23 DIAGNOSIS — L97.322 NON-PRESSURE CHRONIC ULCER OF LEFT ANKLE WITH FAT LAYER EXPOSED: Primary | ICD-10-CM

## 2019-12-23 PROCEDURE — 11042 DBRDMT SUBQ TIS 1ST 20SQCM/<: CPT

## 2019-12-23 PROCEDURE — 99499 UNLISTED E&M SERVICE: CPT | Mod: ,,, | Performed by: SURGERY

## 2019-12-23 PROCEDURE — 27201912 HC WOUND CARE DEBRIDEMENT SUPPLIES

## 2019-12-23 PROCEDURE — 99499 NO LOS: ICD-10-PCS | Mod: ,,, | Performed by: SURGERY

## 2019-12-24 NOTE — PROGRESS NOTES
[facility]  Wound Care  Progress Note    Problem List Items Addressed This Visit        Derm    Non-pressure chronic ulcer of left ankle with fat layer exposed - Primary    Current Assessment & Plan     Wound stabilized.  Continue debridement to maintain active phase of wound healing.           Chronic venous hypertension (idiopathic) with ulcer and inflammation of left lower extremity    Overview     Patient with long-time history of chronic venous hypertension and prior venous ulcerations.  He has utilized stockings in the past but has not utilized stockings recently.  Patient underwent venous ablation with Dr. Barnes of left leg 12/18/19.         Current Assessment & Plan     Wound seems to have stabilized.  Less dermatitis.  Continue compression therapy               See wound doc progress notes. Documents will be scanned.        Arvin Crowe  [facility]

## 2019-12-30 ENCOUNTER — OFFICE VISIT (OUTPATIENT)
Dept: WOUND CARE | Facility: HOSPITAL | Age: 79
End: 2019-12-30
Attending: SURGERY
Payer: MEDICARE

## 2019-12-30 VITALS — HEART RATE: 73 BPM | SYSTOLIC BLOOD PRESSURE: 143 MMHG | DIASTOLIC BLOOD PRESSURE: 75 MMHG | RESPIRATION RATE: 20 BRPM

## 2019-12-30 DIAGNOSIS — I87.332 CHRONIC VENOUS HYPERTENSION (IDIOPATHIC) WITH ULCER AND INFLAMMATION OF LEFT LOWER EXTREMITY: ICD-10-CM

## 2019-12-30 PROCEDURE — 11042 DBRDMT SUBQ TIS 1ST 20SQCM/<: CPT

## 2019-12-30 PROCEDURE — 99499 NO LOS: ICD-10-PCS | Mod: ,,, | Performed by: SURGERY

## 2019-12-30 PROCEDURE — 27201912 HC WOUND CARE DEBRIDEMENT SUPPLIES

## 2019-12-30 PROCEDURE — 99499 UNLISTED E&M SERVICE: CPT | Mod: ,,, | Performed by: SURGERY

## 2019-12-30 NOTE — ASSESSMENT & PLAN NOTE
Wound is improved.  There is a significant amount of granulation tissue.  Minimal amount of slough.  Dermatitis markedly improved.  Edema well controlled.  Patient underwent venous ablation 10 days ago.  Will stop clobetasol.  Continue silver and debridement is needed.  Patient will wear compression stocking for edema control

## 2019-12-30 NOTE — PROGRESS NOTES
[facility]  Wound Care  Progress Note    Problem List Items Addressed This Visit     Chronic venous hypertension (idiopathic) with ulcer and inflammation of left lower extremity    Overview     Patient with long-time history of chronic venous hypertension and prior venous ulcerations.  He has utilized stockings in the past but has not utilized stockings recently.  Patient underwent venous ablation with Dr. Barnes of left leg 12/18/19.         Current Assessment & Plan     Wound is improved.  There is a significant amount of granulation tissue.  Minimal amount of slough.  Dermatitis markedly improved.  Edema well controlled.  Patient underwent venous ablation 10 days ago.  Will stop clobetasol.  Continue silver and debridement is needed.  Patient will wear compression stocking for edema control               See wound doc progress notes. Documents will be scanned.        Travis Galvan  [facility]

## 2020-01-06 ENCOUNTER — OFFICE VISIT (OUTPATIENT)
Dept: WOUND CARE | Facility: HOSPITAL | Age: 80
End: 2020-01-06
Attending: SURGERY
Payer: MEDICARE

## 2020-01-06 VITALS — SYSTOLIC BLOOD PRESSURE: 148 MMHG | DIASTOLIC BLOOD PRESSURE: 72 MMHG | HEART RATE: 72 BPM | TEMPERATURE: 98 F

## 2020-01-06 DIAGNOSIS — L97.929 VENOUS ULCER OF LEFT LOWER EXTREMITY WITH VARICOSE VEINS: ICD-10-CM

## 2020-01-06 DIAGNOSIS — I83.029 VENOUS ULCER OF LEFT LOWER EXTREMITY WITH VARICOSE VEINS: ICD-10-CM

## 2020-01-06 DIAGNOSIS — I87.332 CHRONIC VENOUS HYPERTENSION (IDIOPATHIC) WITH ULCER AND INFLAMMATION OF LEFT LOWER EXTREMITY: ICD-10-CM

## 2020-01-06 DIAGNOSIS — L97.322 NON-PRESSURE CHRONIC ULCER OF LEFT ANKLE WITH FAT LAYER EXPOSED: Primary | ICD-10-CM

## 2020-01-06 PROCEDURE — 27201912 HC WOUND CARE DEBRIDEMENT SUPPLIES

## 2020-01-06 PROCEDURE — 99499 NO LOS: ICD-10-PCS | Mod: ,,, | Performed by: SURGERY

## 2020-01-06 PROCEDURE — 11042 DBRDMT SUBQ TIS 1ST 20SQCM/<: CPT

## 2020-01-06 PROCEDURE — 99499 UNLISTED E&M SERVICE: CPT | Mod: ,,, | Performed by: SURGERY

## 2020-01-06 NOTE — ASSESSMENT & PLAN NOTE
Wound improving.  Calcium deposition was noted in the wound.  Continue debridement to maintain active phase wound healing.

## 2020-01-06 NOTE — PROGRESS NOTES
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  Patient states the wound has been present for several weeks.  Patient says he has had previous ulcers on the right lower extremity.  Patient denies any previous history of a deep vein thrombosis.  Patient denies any lower extremity swelling. Patient says he has been putting over-the-counter cream on the wound for the past several weeks.  Patient denies any significant drainage.  He denies any significant pain fever or chills.  Patient gives a history of blockages in the lower extremities.  Patient says he had a stent placed in the right lower extremity February of 2019.  Patient does smoke daily.  SYLVIA is 0.93 on the left lower extremity. Based on his SYLVIA, anterior circulation is adequate for compression.         Non-pressure chronic ulcer of left ankle with fat layer exposed - Primary    Current Assessment & Plan     Wound improving.  Calcium deposition was noted in the wound.  Continue debridement to maintain active phase wound healing.         Chronic venous hypertension (idiopathic) with ulcer and inflammation of left lower extremity    Overview     Patient with long-time history of chronic venous hypertension and prior venous ulcerations.  He has utilized stockings in the past but has not utilized stockings recently.  Patient underwent venous ablation with Dr. Barnes of left leg 12/18/19.         Current Assessment & Plan     Analia wound inflammation is markedly improved.  Continue compression with stocking.  Patient may require manual compression if stocking not adequate.               See wound doc progress notes. Documents will be scanned.        Arvin Crowe  [facility]

## 2020-01-06 NOTE — ASSESSMENT & PLAN NOTE
Analia wound inflammation is markedly improved.  Continue compression with stocking.  Patient may require manual compression if stocking not adequate.

## 2020-01-13 ENCOUNTER — OFFICE VISIT (OUTPATIENT)
Dept: WOUND CARE | Facility: HOSPITAL | Age: 80
End: 2020-01-13
Attending: SURGERY
Payer: MEDICARE

## 2020-01-13 VITALS
HEART RATE: 74 BPM | RESPIRATION RATE: 20 BRPM | TEMPERATURE: 98 F | DIASTOLIC BLOOD PRESSURE: 89 MMHG | SYSTOLIC BLOOD PRESSURE: 133 MMHG

## 2020-01-13 DIAGNOSIS — I87.332 CHRONIC VENOUS HYPERTENSION (IDIOPATHIC) WITH ULCER AND INFLAMMATION OF LEFT LOWER EXTREMITY: ICD-10-CM

## 2020-01-13 PROCEDURE — 11042 DBRDMT SUBQ TIS 1ST 20SQCM/<: CPT

## 2020-01-13 PROCEDURE — 27201912 HC WOUND CARE DEBRIDEMENT SUPPLIES

## 2020-01-13 PROCEDURE — 99499 NO LOS: ICD-10-PCS | Mod: ,,, | Performed by: SURGERY

## 2020-01-13 PROCEDURE — 99499 UNLISTED E&M SERVICE: CPT | Mod: ,,, | Performed by: SURGERY

## 2020-01-13 NOTE — ASSESSMENT & PLAN NOTE
Wound bed mostly granulating.  Edema well controlled.  Continue silver dressing.  Continue compression and elevation for edema control. Patient has follow-up with Dr. Barnes next week for repeat vein injection

## 2020-01-13 NOTE — PROGRESS NOTES
[facility]  Wound Care  Progress Note    Problem List Items Addressed This Visit     Chronic venous hypertension (idiopathic) with ulcer and inflammation of left lower extremity    Overview     Patient with long-time history of chronic venous hypertension and prior venous ulcerations.  He has utilized stockings in the past but has not utilized stockings recently.  Patient underwent venous ablation with Dr. Barnes of left leg 12/18/19.         Current Assessment & Plan     Wound bed mostly granulating.  Edema well controlled.  Continue silver dressing.  Continue compression and elevation for edema control. Patient has follow-up with Dr. Barnes next week for repeat vein injection               See wound doc progress notes. Documents will be scanned.        Travis Galvan  [facility]

## 2020-01-20 ENCOUNTER — OFFICE VISIT (OUTPATIENT)
Dept: WOUND CARE | Facility: HOSPITAL | Age: 80
End: 2020-01-20
Attending: SURGERY
Payer: MEDICARE

## 2020-01-20 VITALS
DIASTOLIC BLOOD PRESSURE: 79 MMHG | SYSTOLIC BLOOD PRESSURE: 124 MMHG | TEMPERATURE: 98 F | HEART RATE: 88 BPM | RESPIRATION RATE: 20 BRPM

## 2020-01-20 DIAGNOSIS — L97.322 NON-PRESSURE CHRONIC ULCER OF LEFT ANKLE WITH FAT LAYER EXPOSED: Primary | ICD-10-CM

## 2020-01-20 DIAGNOSIS — I83.029 VENOUS ULCER OF LEFT LOWER EXTREMITY WITH VARICOSE VEINS: ICD-10-CM

## 2020-01-20 DIAGNOSIS — I87.332 CHRONIC VENOUS HYPERTENSION (IDIOPATHIC) WITH ULCER AND INFLAMMATION OF LEFT LOWER EXTREMITY: ICD-10-CM

## 2020-01-20 DIAGNOSIS — L97.929 VENOUS ULCER OF LEFT LOWER EXTREMITY WITH VARICOSE VEINS: ICD-10-CM

## 2020-01-20 PROCEDURE — 99499 NO LOS: ICD-10-PCS | Mod: ,,, | Performed by: SURGERY

## 2020-01-20 PROCEDURE — 11042 DBRDMT SUBQ TIS 1ST 20SQCM/<: CPT

## 2020-01-20 PROCEDURE — 99499 UNLISTED E&M SERVICE: CPT | Mod: ,,, | Performed by: SURGERY

## 2020-01-20 PROCEDURE — 27201912 HC WOUND CARE DEBRIDEMENT SUPPLIES

## 2020-01-20 NOTE — PROGRESS NOTES
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  Patient states the wound has been present for several weeks.  Patient says he has had previous ulcers on the right lower extremity.  Patient denies any previous history of a deep vein thrombosis.  Patient denies any lower extremity swelling. Patient says he has been putting over-the-counter cream on the wound for the past several weeks.  Patient denies any significant drainage.  He denies any significant pain fever or chills.  Patient gives a history of blockages in the lower extremities.  Patient says he had a stent placed in the right lower extremity February of 2019.  Patient does smoke daily.  SYLVIA is 0.93 on the left lower extremity. Based on his SYLVIA, anterior circulation is adequate for compression.         Current Assessment & Plan     Wound with little change.  Continue debridement to maintain active phase of wound healing.  2 layer compression. Stocking likely does not provide enough compression.         Non-pressure chronic ulcer of left ankle with fat layer exposed - Primary    Chronic venous hypertension (idiopathic) with ulcer and inflammation of left lower extremity    Overview     Patient with long-time history of chronic venous hypertension and prior venous ulcerations.  He has utilized stockings in the past but has not utilized stockings recently.  Patient underwent venous ablation with Dr. Barnes of left leg 12/18/19.               See wound doc progress notes. Documents will be scanned.        Arvin Crowe  [facility]

## 2020-01-20 NOTE — ASSESSMENT & PLAN NOTE
Wound with little change.  Continue debridement to maintain active phase of wound healing.  2 layer compression. Stocking likely does not provide enough compression.

## 2020-01-23 ENCOUNTER — CLINICAL SUPPORT (OUTPATIENT)
Dept: WOUND CARE | Facility: HOSPITAL | Age: 80
End: 2020-01-23
Attending: SURGERY
Payer: MEDICARE

## 2020-01-23 DIAGNOSIS — L97.322 NON-PRESSURE CHRONIC ULCER OF LEFT ANKLE WITH FAT LAYER EXPOSED: Primary | ICD-10-CM

## 2020-01-23 PROCEDURE — 29581 APPL MULTLAYER CMPRN SYS LEG: CPT | Mod: LT

## 2020-01-24 VITALS
SYSTOLIC BLOOD PRESSURE: 136 MMHG | HEART RATE: 84 BPM | TEMPERATURE: 98 F | DIASTOLIC BLOOD PRESSURE: 78 MMHG | RESPIRATION RATE: 20 BRPM

## 2020-01-27 ENCOUNTER — OFFICE VISIT (OUTPATIENT)
Dept: WOUND CARE | Facility: HOSPITAL | Age: 80
End: 2020-01-27
Attending: SURGERY
Payer: MEDICARE

## 2020-01-27 VITALS — HEART RATE: 75 BPM | DIASTOLIC BLOOD PRESSURE: 78 MMHG | SYSTOLIC BLOOD PRESSURE: 155 MMHG | TEMPERATURE: 98 F

## 2020-01-27 DIAGNOSIS — I83.029 VENOUS ULCER OF LEFT LOWER EXTREMITY WITH VARICOSE VEINS: ICD-10-CM

## 2020-01-27 DIAGNOSIS — L97.929 VENOUS ULCER OF LEFT LOWER EXTREMITY WITH VARICOSE VEINS: ICD-10-CM

## 2020-01-27 PROCEDURE — 27201912 HC WOUND CARE DEBRIDEMENT SUPPLIES

## 2020-01-27 PROCEDURE — 99499 NO LOS: ICD-10-PCS | Mod: ,,, | Performed by: SURGERY

## 2020-01-27 PROCEDURE — 11042 DBRDMT SUBQ TIS 1ST 20SQCM/<: CPT

## 2020-01-27 PROCEDURE — 99499 UNLISTED E&M SERVICE: CPT | Mod: ,,, | Performed by: SURGERY

## 2020-01-27 NOTE — ASSESSMENT & PLAN NOTE
Wound has more granulation tissue present.  There is still some slough present.  Patient is scheduled for repeat venous procedure today.  Continue sharp debridement is needed.  Continue compression.

## 2020-01-27 NOTE — PROGRESS NOTES
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  Patient states the wound has been present for several weeks.  Patient says he has had previous ulcers on the right lower extremity.  Patient denies any previous history of a deep vein thrombosis.  Patient denies any lower extremity swelling. Patient says he has been putting over-the-counter cream on the wound for the past several weeks.  Patient denies any significant drainage.  He denies any significant pain fever or chills.  Patient gives a history of blockages in the lower extremities.  Patient says he had a stent placed in the right lower extremity February of 2019.  Patient does smoke daily.  SYLVIA is 0.93 on the left lower extremity. Based on his SYLVIA, anterior circulation is adequate for compression.         Current Assessment & Plan     Wound has more granulation tissue present.  There is still some slough present.  Patient is scheduled for repeat venous procedure today.  Continue sharp debridement is needed.  Continue compression.               See wound doc progress notes. Documents will be scanned.        Travis Galvan  [facility]

## 2020-01-30 ENCOUNTER — CLINICAL SUPPORT (OUTPATIENT)
Dept: WOUND CARE | Facility: HOSPITAL | Age: 80
End: 2020-01-30
Attending: SURGERY
Payer: MEDICARE

## 2020-01-30 VITALS — RESPIRATION RATE: 20 BRPM

## 2020-01-30 DIAGNOSIS — I87.332 CHRONIC VENOUS HYPERTENSION (IDIOPATHIC) WITH ULCER AND INFLAMMATION OF LEFT LOWER EXTREMITY: ICD-10-CM

## 2020-01-30 DIAGNOSIS — L97.929 VENOUS ULCER OF LEFT LOWER EXTREMITY WITH VARICOSE VEINS: ICD-10-CM

## 2020-01-30 DIAGNOSIS — L97.322 NON-PRESSURE CHRONIC ULCER OF LEFT ANKLE WITH FAT LAYER EXPOSED: ICD-10-CM

## 2020-01-30 DIAGNOSIS — I83.029 VENOUS ULCER OF LEFT LOWER EXTREMITY WITH VARICOSE VEINS: ICD-10-CM

## 2020-01-30 PROCEDURE — 99213 OFFICE O/P EST LOW 20 MIN: CPT

## 2020-02-03 ENCOUNTER — OFFICE VISIT (OUTPATIENT)
Dept: WOUND CARE | Facility: HOSPITAL | Age: 80
End: 2020-02-03
Attending: SURGERY
Payer: MEDICARE

## 2020-02-03 VITALS
RESPIRATION RATE: 20 BRPM | SYSTOLIC BLOOD PRESSURE: 120 MMHG | HEART RATE: 70 BPM | TEMPERATURE: 98 F | DIASTOLIC BLOOD PRESSURE: 68 MMHG

## 2020-02-03 DIAGNOSIS — I87.332 CHRONIC VENOUS HYPERTENSION (IDIOPATHIC) WITH ULCER AND INFLAMMATION OF LEFT LOWER EXTREMITY: ICD-10-CM

## 2020-02-03 DIAGNOSIS — L97.929 VENOUS ULCER OF LEFT LOWER EXTREMITY WITH VARICOSE VEINS: ICD-10-CM

## 2020-02-03 DIAGNOSIS — L97.322 NON-PRESSURE CHRONIC ULCER OF LEFT ANKLE WITH FAT LAYER EXPOSED: ICD-10-CM

## 2020-02-03 DIAGNOSIS — I83.029 VENOUS ULCER OF LEFT LOWER EXTREMITY WITH VARICOSE VEINS: ICD-10-CM

## 2020-02-03 PROCEDURE — 27201912 HC WOUND CARE DEBRIDEMENT SUPPLIES

## 2020-02-03 PROCEDURE — 99499 NO LOS: ICD-10-PCS | Mod: ,,, | Performed by: SURGERY

## 2020-02-03 PROCEDURE — 99499 UNLISTED E&M SERVICE: CPT | Mod: ,,, | Performed by: SURGERY

## 2020-02-03 PROCEDURE — 11042 DBRDMT SUBQ TIS 1ST 20SQCM/<: CPT

## 2020-02-06 ENCOUNTER — OFFICE VISIT (OUTPATIENT)
Dept: WOUND CARE | Facility: HOSPITAL | Age: 80
End: 2020-02-06
Attending: SURGERY
Payer: MEDICARE

## 2020-02-06 DIAGNOSIS — I87.332 CHRONIC VENOUS HYPERTENSION (IDIOPATHIC) WITH ULCER AND INFLAMMATION OF LEFT LOWER EXTREMITY: ICD-10-CM

## 2020-02-06 DIAGNOSIS — L97.322 NON-PRESSURE CHRONIC ULCER OF LEFT ANKLE WITH FAT LAYER EXPOSED: ICD-10-CM

## 2020-02-06 PROCEDURE — 99499 UNLISTED E&M SERVICE: CPT | Mod: ,,, | Performed by: SURGERY

## 2020-02-06 PROCEDURE — 29581 APPL MULTLAYER CMPRN SYS LEG: CPT | Mod: LT

## 2020-02-06 PROCEDURE — 99499 NO LOS: ICD-10-PCS | Mod: ,,, | Performed by: SURGERY

## 2020-02-07 NOTE — PROGRESS NOTES
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  Patient states the wound has been present for several weeks.  Patient says he has had previous ulcers on the right lower extremity.  Patient denies any previous history of a deep vein thrombosis.  Patient denies any lower extremity swelling. Patient says he has been putting over-the-counter cream on the wound for the past several weeks.  Patient denies any significant drainage.  He denies any significant pain fever or chills.  Patient gives a history of blockages in the lower extremities.  Patient says he had a stent placed in the right lower extremity February of 2019.  Patient does smoke daily.  SYLVIA is 0.93 on the left lower extremity. Based on his SYLVIA, anterior circulation is adequate for compression.         Non-pressure chronic ulcer of left ankle with fat layer exposed    Current Assessment & Plan     Wound with limited change.  Debridement to maintain active phase of wound healing.  Compression has not been consistent. 2 layer compression.         Chronic venous hypertension (idiopathic) with ulcer and inflammation of left lower extremity    Overview     Patient with long-time history of chronic venous hypertension and prior venous ulcerations.  He has utilized stockings in the past but has not utilized stockings recently.  Patient underwent venous ablation with Dr. Barnes of left leg 12/18/19.               See wound doc progress notes. Documents will be scanned.        Arvin Crowe  [facility]

## 2020-02-07 NOTE — ASSESSMENT & PLAN NOTE
Wound with limited change.  Debridement to maintain active phase of wound healing.  Compression has not been consistent. 2 layer compression.

## 2020-02-10 ENCOUNTER — OFFICE VISIT (OUTPATIENT)
Dept: WOUND CARE | Facility: HOSPITAL | Age: 80
End: 2020-02-10
Attending: SURGERY
Payer: MEDICARE

## 2020-02-10 VITALS
RESPIRATION RATE: 18 BRPM | HEART RATE: 71 BPM | TEMPERATURE: 98 F | SYSTOLIC BLOOD PRESSURE: 168 MMHG | DIASTOLIC BLOOD PRESSURE: 77 MMHG

## 2020-02-10 VITALS — SYSTOLIC BLOOD PRESSURE: 152 MMHG | HEART RATE: 74 BPM | DIASTOLIC BLOOD PRESSURE: 68 MMHG

## 2020-02-10 DIAGNOSIS — L97.929 VENOUS ULCER OF LEFT LOWER EXTREMITY WITH VARICOSE VEINS: ICD-10-CM

## 2020-02-10 DIAGNOSIS — I83.029 VENOUS ULCER OF LEFT LOWER EXTREMITY WITH VARICOSE VEINS: ICD-10-CM

## 2020-02-10 PROCEDURE — 99499 NO LOS: ICD-10-PCS | Mod: ,,, | Performed by: SURGERY

## 2020-02-10 PROCEDURE — 27201912 HC WOUND CARE DEBRIDEMENT SUPPLIES

## 2020-02-10 PROCEDURE — 99499 UNLISTED E&M SERVICE: CPT | Mod: ,,, | Performed by: SURGERY

## 2020-02-10 PROCEDURE — 11042 DBRDMT SUBQ TIS 1ST 20SQCM/<: CPT

## 2020-02-10 NOTE — PROGRESS NOTES
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  Patient states the wound has been present for several weeks.  Patient says he has had previous ulcers on the right lower extremity.  Patient denies any previous history of a deep vein thrombosis.  Patient denies any lower extremity swelling. Patient says he has been putting over-the-counter cream on the wound for the past several weeks.  Patient denies any significant drainage.  He denies any significant pain fever or chills.  Patient gives a history of blockages in the lower extremities.  Patient says he had a stent placed in the right lower extremity February of 2019.  Patient does smoke daily.  SYLVIA is 0.93 on the left lower extremity. Based on his SYLVIA, anterior circulation is adequate for compression.         Current Assessment & Plan     Patient underwent repeat venous procedure last week.  It sounds like patient had sclerotherapy performed to varicosities underlying his ulcer.  Patient had several days of increased drainage after procedure but this has resolved.  Edema remains fairly well controlled with compression.  Wound bed is fairly clean with minimal slough present.  There is epithelialization at the wound edges.  There is fair amount of granulation tissue also.  Continue compression and current management of the wound.               See wound doc progress notes. Documents will be scanned.        Travis Galvan  [facility]

## 2020-02-10 NOTE — ASSESSMENT & PLAN NOTE
Patient underwent repeat venous procedure last week.  It sounds like patient had sclerotherapy performed to varicosities underlying his ulcer.  Patient had several days of increased drainage after procedure but this has resolved.  Edema remains fairly well controlled with compression.  Wound bed is fairly clean with minimal slough present.  There is epithelialization at the wound edges.  There is fair amount of granulation tissue also.  Continue compression and current management of the wound.

## 2020-02-13 ENCOUNTER — CLINICAL SUPPORT (OUTPATIENT)
Dept: WOUND CARE | Facility: HOSPITAL | Age: 80
End: 2020-02-13
Attending: SURGERY
Payer: MEDICARE

## 2020-02-13 VITALS
RESPIRATION RATE: 18 BRPM | HEART RATE: 84 BPM | TEMPERATURE: 98 F | DIASTOLIC BLOOD PRESSURE: 100 MMHG | SYSTOLIC BLOOD PRESSURE: 155 MMHG

## 2020-02-13 DIAGNOSIS — I83.029 VENOUS ULCER OF LEFT LOWER EXTREMITY WITH VARICOSE VEINS: ICD-10-CM

## 2020-02-13 DIAGNOSIS — L97.929 VENOUS ULCER OF LEFT LOWER EXTREMITY WITH VARICOSE VEINS: ICD-10-CM

## 2020-02-13 DIAGNOSIS — I87.332 CHRONIC VENOUS HYPERTENSION (IDIOPATHIC) WITH ULCER AND INFLAMMATION OF LEFT LOWER EXTREMITY: ICD-10-CM

## 2020-02-13 DIAGNOSIS — L97.322 NON-PRESSURE CHRONIC ULCER OF LEFT ANKLE WITH FAT LAYER EXPOSED: ICD-10-CM

## 2020-02-13 PROCEDURE — 29581 APPL MULTLAYER CMPRN SYS LEG: CPT | Mod: LT

## 2020-02-17 ENCOUNTER — OFFICE VISIT (OUTPATIENT)
Dept: WOUND CARE | Facility: HOSPITAL | Age: 80
End: 2020-02-17
Attending: SURGERY
Payer: MEDICARE

## 2020-02-17 VITALS
HEART RATE: 72 BPM | DIASTOLIC BLOOD PRESSURE: 78 MMHG | SYSTOLIC BLOOD PRESSURE: 156 MMHG | TEMPERATURE: 98 F | RESPIRATION RATE: 18 BRPM

## 2020-02-17 DIAGNOSIS — L97.929 VENOUS ULCER OF LEFT LOWER EXTREMITY WITH VARICOSE VEINS: ICD-10-CM

## 2020-02-17 DIAGNOSIS — I83.029 VENOUS ULCER OF LEFT LOWER EXTREMITY WITH VARICOSE VEINS: ICD-10-CM

## 2020-02-17 DIAGNOSIS — L97.322 NON-PRESSURE CHRONIC ULCER OF LEFT ANKLE WITH FAT LAYER EXPOSED: Primary | ICD-10-CM

## 2020-02-17 DIAGNOSIS — I87.332 CHRONIC VENOUS HYPERTENSION (IDIOPATHIC) WITH ULCER AND INFLAMMATION OF LEFT LOWER EXTREMITY: ICD-10-CM

## 2020-02-17 PROCEDURE — 11042 DBRDMT SUBQ TIS 1ST 20SQCM/<: CPT

## 2020-02-17 PROCEDURE — 27201912 HC WOUND CARE DEBRIDEMENT SUPPLIES

## 2020-02-17 PROCEDURE — 99499 NO LOS: ICD-10-PCS | Mod: ,,, | Performed by: SURGERY

## 2020-02-17 PROCEDURE — 99499 UNLISTED E&M SERVICE: CPT | Mod: ,,, | Performed by: SURGERY

## 2020-02-17 NOTE — PROGRESS NOTES
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  Patient states the wound has been present for several weeks.  Patient says he has had previous ulcers on the right lower extremity.  Patient denies any previous history of a deep vein thrombosis.  Patient denies any lower extremity swelling. Patient says he has been putting over-the-counter cream on the wound for the past several weeks.  Patient denies any significant drainage.  He denies any significant pain fever or chills.  Patient gives a history of blockages in the lower extremities.  Patient says he had a stent placed in the right lower extremity February of 2019.  Patient does smoke daily.  SYLVIA is 0.93 on the left lower extremity. Based on his SYLVIA, anterior circulation is adequate for compression.         Non-pressure chronic ulcer of left ankle with fat layer exposed - Primary    Current Assessment & Plan     Wound is improving with compression.  Continue debridement to maintain active phase wound healing.  Continue compression therapy with 2 layer compression.         Chronic venous hypertension (idiopathic) with ulcer and inflammation of left lower extremity    Overview     Patient with long-time history of chronic venous hypertension and prior venous ulcerations.  He has utilized stockings in the past but has not utilized stockings recently.  Patient underwent venous ablation with Dr. Barnes of left leg 12/18/19.               See wound doc progress notes. Documents will be scanned.        Arvin Crowe  [facility]

## 2020-02-17 NOTE — ASSESSMENT & PLAN NOTE
Wound is improving with compression.  Continue debridement to maintain active phase wound healing.  Continue compression therapy with 2 layer compression.

## 2020-02-20 ENCOUNTER — CLINICAL SUPPORT (OUTPATIENT)
Dept: WOUND CARE | Facility: HOSPITAL | Age: 80
End: 2020-02-20
Attending: SURGERY
Payer: MEDICARE

## 2020-02-20 VITALS
SYSTOLIC BLOOD PRESSURE: 153 MMHG | DIASTOLIC BLOOD PRESSURE: 86 MMHG | RESPIRATION RATE: 18 BRPM | HEART RATE: 67 BPM | TEMPERATURE: 98 F

## 2020-02-20 DIAGNOSIS — I83.029 VENOUS ULCER OF LEFT LOWER EXTREMITY WITH VARICOSE VEINS: ICD-10-CM

## 2020-02-20 DIAGNOSIS — L97.929 VENOUS ULCER OF LEFT LOWER EXTREMITY WITH VARICOSE VEINS: ICD-10-CM

## 2020-02-20 DIAGNOSIS — L97.322 NON-PRESSURE CHRONIC ULCER OF LEFT ANKLE WITH FAT LAYER EXPOSED: ICD-10-CM

## 2020-02-20 DIAGNOSIS — I87.332 CHRONIC VENOUS HYPERTENSION (IDIOPATHIC) WITH ULCER AND INFLAMMATION OF LEFT LOWER EXTREMITY: ICD-10-CM

## 2020-02-20 PROCEDURE — 29581 APPL MULTLAYER CMPRN SYS LEG: CPT | Mod: LT

## 2020-02-27 ENCOUNTER — OFFICE VISIT (OUTPATIENT)
Dept: WOUND CARE | Facility: HOSPITAL | Age: 80
End: 2020-02-27
Attending: SURGERY
Payer: MEDICARE

## 2020-02-27 VITALS
HEART RATE: 67 BPM | RESPIRATION RATE: 18 BRPM | DIASTOLIC BLOOD PRESSURE: 80 MMHG | SYSTOLIC BLOOD PRESSURE: 160 MMHG | TEMPERATURE: 98 F

## 2020-02-27 DIAGNOSIS — L97.929 VENOUS ULCER OF LEFT LOWER EXTREMITY WITH VARICOSE VEINS: ICD-10-CM

## 2020-02-27 DIAGNOSIS — I83.029 VENOUS ULCER OF LEFT LOWER EXTREMITY WITH VARICOSE VEINS: ICD-10-CM

## 2020-02-27 PROCEDURE — 27201912 HC WOUND CARE DEBRIDEMENT SUPPLIES

## 2020-02-27 PROCEDURE — 99499 NO LOS: ICD-10-PCS | Mod: ,,, | Performed by: SURGERY

## 2020-02-27 PROCEDURE — 11042 DBRDMT SUBQ TIS 1ST 20SQCM/<: CPT

## 2020-02-27 PROCEDURE — 99499 UNLISTED E&M SERVICE: CPT | Mod: ,,, | Performed by: SURGERY

## 2020-02-27 NOTE — PROGRESS NOTES
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  Patient states the wound has been present for several weeks.  Patient says he has had previous ulcers on the right lower extremity.  Patient denies any previous history of a deep vein thrombosis.  Patient denies any lower extremity swelling. Patient says he has been putting over-the-counter cream on the wound for the past several weeks.  Patient denies any significant drainage.  He denies any significant pain fever or chills.  Patient gives a history of blockages in the lower extremities.  Patient says he had a stent placed in the right lower extremity February of 2019.  Patient does smoke daily.  Sharp debridement performed. Enluxtra and 2 layer compression.  SYLVIA is 0.93 on the left lower extremity. Based on his SYLVIA, anterior circulation is adequate for compression.               See wound doc progress notes. Documents will be scanned.        Timothy Cline Jr  [facility]

## 2020-02-28 NOTE — PROGRESS NOTES
[facility]  Wound Care  Progress Note    Problem List Items Addressed This Visit     Non-pressure chronic ulcer of left ankle with fat layer exposed    Chronic venous hypertension (idiopathic) with ulcer and inflammation of left lower extremity    Overview     Patient with long-time history of chronic venous hypertension and prior venous ulcerations.  He has utilized stockings in the past but has not utilized stockings recently.  Patient underwent venous ablation with Dr. Barnes of left leg 12/18/19.         Current Assessment & Plan     Nurse visit onlyt\               See wound doc progress notes. Documents will be scanned.        Arsenio Albert  [facility]

## 2020-03-02 ENCOUNTER — OFFICE VISIT (OUTPATIENT)
Dept: WOUND CARE | Facility: HOSPITAL | Age: 80
End: 2020-03-02
Attending: SURGERY
Payer: MEDICARE

## 2020-03-02 VITALS
DIASTOLIC BLOOD PRESSURE: 69 MMHG | SYSTOLIC BLOOD PRESSURE: 122 MMHG | TEMPERATURE: 97 F | RESPIRATION RATE: 20 BRPM | HEART RATE: 71 BPM

## 2020-03-02 DIAGNOSIS — L97.322 NON-PRESSURE CHRONIC ULCER OF LEFT ANKLE WITH FAT LAYER EXPOSED: Primary | ICD-10-CM

## 2020-03-02 DIAGNOSIS — I83.029 VENOUS ULCER OF LEFT LOWER EXTREMITY WITH VARICOSE VEINS: ICD-10-CM

## 2020-03-02 DIAGNOSIS — L97.929 VENOUS ULCER OF LEFT LOWER EXTREMITY WITH VARICOSE VEINS: ICD-10-CM

## 2020-03-02 DIAGNOSIS — I87.332 CHRONIC VENOUS HYPERTENSION (IDIOPATHIC) WITH ULCER AND INFLAMMATION OF LEFT LOWER EXTREMITY: ICD-10-CM

## 2020-03-02 PROCEDURE — 11042 DBRDMT SUBQ TIS 1ST 20SQCM/<: CPT

## 2020-03-02 PROCEDURE — 99499 NO LOS: ICD-10-PCS | Mod: ,,, | Performed by: SURGERY

## 2020-03-02 PROCEDURE — 99499 UNLISTED E&M SERVICE: CPT | Mod: ,,, | Performed by: SURGERY

## 2020-03-02 PROCEDURE — 27201912 HC WOUND CARE DEBRIDEMENT SUPPLIES

## 2020-03-02 NOTE — ASSESSMENT & PLAN NOTE
Wound is improving.  Continue debridement to maintain active phase wound healing.  Continue compressive therapy.

## 2020-03-02 NOTE — PROGRESS NOTES
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  Patient states the wound has been present for several weeks.  Patient says he has had previous ulcers on the right lower extremity.  Patient denies any previous history of a deep vein thrombosis.  Patient denies any lower extremity swelling. Patient says he has been putting over-the-counter cream on the wound for the past several weeks.  Patient denies any significant drainage.  He denies any significant pain fever or chills.  Patient gives a history of blockages in the lower extremities.  Patient says he had a stent placed in the right lower extremity February of 2019.  Patient does smoke daily.  Sharp debridement performed. Enluxtra and 2 layer compression.  SYLVIA is 0.93 on the left lower extremity. Based on his SYLVIA, arterial circulation is adequate for compression.         Current Assessment & Plan     Wound is improving.  Continue debridement to maintain active phase wound healing.  Continue compressive therapy.         Non-pressure chronic ulcer of left ankle with fat layer exposed - Primary    Chronic venous hypertension (idiopathic) with ulcer and inflammation of left lower extremity    Overview     Patient with long-time history of chronic venous hypertension and prior venous ulcerations.  He has utilized stockings in the past but has not utilized stockings recently.  Patient underwent venous ablation with Dr. Barnes of left leg 12/18/19.               See wound doc progress notes. Documents will be scanned.        Arvin Crowe  [facility]

## 2020-03-05 ENCOUNTER — CLINICAL SUPPORT (OUTPATIENT)
Dept: WOUND CARE | Facility: HOSPITAL | Age: 80
End: 2020-03-05
Attending: SURGERY
Payer: MEDICARE

## 2020-03-05 VITALS
HEART RATE: 82 BPM | SYSTOLIC BLOOD PRESSURE: 161 MMHG | RESPIRATION RATE: 19 BRPM | TEMPERATURE: 98 F | DIASTOLIC BLOOD PRESSURE: 96 MMHG

## 2020-03-05 DIAGNOSIS — L97.929 VENOUS ULCER OF LEFT LOWER EXTREMITY WITH VARICOSE VEINS: ICD-10-CM

## 2020-03-05 DIAGNOSIS — L97.322 NON-PRESSURE CHRONIC ULCER OF LEFT ANKLE WITH FAT LAYER EXPOSED: ICD-10-CM

## 2020-03-05 DIAGNOSIS — I83.029 VENOUS ULCER OF LEFT LOWER EXTREMITY WITH VARICOSE VEINS: ICD-10-CM

## 2020-03-05 DIAGNOSIS — I87.332 CHRONIC VENOUS HYPERTENSION (IDIOPATHIC) WITH ULCER AND INFLAMMATION OF LEFT LOWER EXTREMITY: ICD-10-CM

## 2020-03-05 PROCEDURE — 29581 APPL MULTLAYER CMPRN SYS LEG: CPT | Mod: LT

## 2020-03-09 ENCOUNTER — OFFICE VISIT (OUTPATIENT)
Dept: WOUND CARE | Facility: HOSPITAL | Age: 80
End: 2020-03-09
Attending: SURGERY
Payer: MEDICARE

## 2020-03-09 VITALS
RESPIRATION RATE: 20 BRPM | DIASTOLIC BLOOD PRESSURE: 74 MMHG | HEART RATE: 68 BPM | SYSTOLIC BLOOD PRESSURE: 139 MMHG | TEMPERATURE: 98 F

## 2020-03-09 DIAGNOSIS — I87.332 CHRONIC VENOUS HYPERTENSION (IDIOPATHIC) WITH ULCER AND INFLAMMATION OF LEFT LOWER EXTREMITY: ICD-10-CM

## 2020-03-09 PROCEDURE — 27201912 HC WOUND CARE DEBRIDEMENT SUPPLIES

## 2020-03-09 PROCEDURE — 99499 NO LOS: ICD-10-PCS | Mod: ,,, | Performed by: SURGERY

## 2020-03-09 PROCEDURE — 11042 DBRDMT SUBQ TIS 1ST 20SQCM/<: CPT

## 2020-03-09 PROCEDURE — 99499 UNLISTED E&M SERVICE: CPT | Mod: ,,, | Performed by: SURGERY

## 2020-03-09 NOTE — PROGRESS NOTES
[facility]  Wound Care  Progress Note    Problem List Items Addressed This Visit     Chronic venous hypertension (idiopathic) with ulcer and inflammation of left lower extremity    Overview     Patient with long-time history of chronic venous hypertension and prior venous ulcerations.  He has utilized stockings in the past but has not utilized stockings recently.  Patient underwent venous ablation with Dr. Barnes of left leg 12/18/19.         Current Assessment & Plan     Wound is markedly improved.  There is no significant slough.  Wound is clean and granulating.  Minimal macerations surrounding the wound edges.  Will continue the eluxtra.  Continue gentian violet to the miesha wound area.  Continue to wear compression wrap               See wound doc progress notes. Documents will be scanned.        Travis Galvan  [facility]

## 2020-03-09 NOTE — ASSESSMENT & PLAN NOTE
Wound is markedly improved.  There is no significant slough.  Wound is clean and granulating.  Minimal macerations surrounding the wound edges.  Will continue the eluxtra.  Continue gentian violet to the miesha wound area.  Continue to wear compression wrap

## 2020-03-12 ENCOUNTER — CLINICAL SUPPORT (OUTPATIENT)
Dept: WOUND CARE | Facility: HOSPITAL | Age: 80
End: 2020-03-12
Attending: SURGERY
Payer: MEDICARE

## 2020-03-12 VITALS
RESPIRATION RATE: 20 BRPM | HEART RATE: 66 BPM | SYSTOLIC BLOOD PRESSURE: 154 MMHG | TEMPERATURE: 98 F | DIASTOLIC BLOOD PRESSURE: 87 MMHG

## 2020-03-12 DIAGNOSIS — L97.322 NON-PRESSURE CHRONIC ULCER OF LEFT ANKLE WITH FAT LAYER EXPOSED: Primary | ICD-10-CM

## 2020-03-12 PROCEDURE — 29581 APPL MULTLAYER CMPRN SYS LEG: CPT | Mod: LT

## 2020-03-16 ENCOUNTER — OFFICE VISIT (OUTPATIENT)
Dept: WOUND CARE | Facility: HOSPITAL | Age: 80
End: 2020-03-16
Attending: SURGERY
Payer: MEDICARE

## 2020-03-16 VITALS
HEART RATE: 66 BPM | TEMPERATURE: 98 F | RESPIRATION RATE: 20 BRPM | DIASTOLIC BLOOD PRESSURE: 81 MMHG | SYSTOLIC BLOOD PRESSURE: 156 MMHG

## 2020-03-16 DIAGNOSIS — L97.322 NON-PRESSURE CHRONIC ULCER OF LEFT ANKLE WITH FAT LAYER EXPOSED: Primary | ICD-10-CM

## 2020-03-16 DIAGNOSIS — I83.029 VENOUS ULCER OF LEFT LOWER EXTREMITY WITH VARICOSE VEINS: ICD-10-CM

## 2020-03-16 DIAGNOSIS — L97.929 VENOUS ULCER OF LEFT LOWER EXTREMITY WITH VARICOSE VEINS: ICD-10-CM

## 2020-03-16 DIAGNOSIS — I87.332 CHRONIC VENOUS HYPERTENSION (IDIOPATHIC) WITH ULCER AND INFLAMMATION OF LEFT LOWER EXTREMITY: ICD-10-CM

## 2020-03-16 PROCEDURE — 11042 DBRDMT SUBQ TIS 1ST 20SQCM/<: CPT

## 2020-03-16 PROCEDURE — 27201912 HC WOUND CARE DEBRIDEMENT SUPPLIES

## 2020-03-16 PROCEDURE — 99499 NO LOS: ICD-10-PCS | Mod: ,,, | Performed by: SURGERY

## 2020-03-16 PROCEDURE — 99499 UNLISTED E&M SERVICE: CPT | Mod: ,,, | Performed by: SURGERY

## 2020-03-16 NOTE — ASSESSMENT & PLAN NOTE
Wound continues to make excellent progress.  There is rapid advancement of epithelium.  There is no leg swelling.  Continue debridement to maintain active phase wound healing.  Continue compression therapy.

## 2020-03-16 NOTE — PROGRESS NOTES
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  Patient states the wound has been present for several weeks.  Patient says he has had previous ulcers on the right lower extremity.  Patient denies any previous history of a deep vein thrombosis.  Patient denies any lower extremity swelling. Patient says he has been putting over-the-counter cream on the wound for the past several weeks.  Patient denies any significant drainage.  He denies any significant pain fever or chills.  Patient gives a history of blockages in the lower extremities.  Patient says he had a stent placed in the right lower extremity February of 2019.  Patient does smoke daily.  Sharp debridement performed. Enluxtra and 2 layer compression.  SYLVIA is 0.93 on the left lower extremity. Based on his SYLVIA, arterial circulation is adequate for compression.         Non-pressure chronic ulcer of left ankle with fat layer exposed - Primary    Chronic venous hypertension (idiopathic) with ulcer and inflammation of left lower extremity    Overview     Patient with long-time history of chronic venous hypertension and prior venous ulcerations.  He has utilized stockings in the past but has not utilized stockings recently.  Patient underwent venous ablation with Dr. Barnes of left leg 12/18/19.         Current Assessment & Plan     Wound continues to make excellent progress.  There is rapid advancement of epithelium.  There is no leg swelling.  Continue debridement to maintain active phase wound healing.  Continue compression therapy.               See wound doc progress notes. Documents will be scanned.        Arvin Crowe  [facility]

## 2020-03-19 ENCOUNTER — CLINICAL SUPPORT (OUTPATIENT)
Dept: WOUND CARE | Facility: HOSPITAL | Age: 80
End: 2020-03-19
Attending: SURGERY
Payer: MEDICARE

## 2020-03-19 VITALS
RESPIRATION RATE: 18 BRPM | HEART RATE: 71 BPM | SYSTOLIC BLOOD PRESSURE: 152 MMHG | TEMPERATURE: 98 F | DIASTOLIC BLOOD PRESSURE: 80 MMHG

## 2020-03-19 DIAGNOSIS — L97.322 NON-PRESSURE CHRONIC ULCER OF LEFT ANKLE WITH FAT LAYER EXPOSED: ICD-10-CM

## 2020-03-19 DIAGNOSIS — I87.332 CHRONIC VENOUS HYPERTENSION (IDIOPATHIC) WITH ULCER AND INFLAMMATION OF LEFT LOWER EXTREMITY: ICD-10-CM

## 2020-03-19 PROCEDURE — 29581 APPL MULTLAYER CMPRN SYS LEG: CPT | Mod: LT

## 2020-03-23 ENCOUNTER — OFFICE VISIT (OUTPATIENT)
Dept: WOUND CARE | Facility: HOSPITAL | Age: 80
End: 2020-03-23
Attending: SURGERY
Payer: MEDICARE

## 2020-03-23 VITALS
HEART RATE: 68 BPM | RESPIRATION RATE: 18 BRPM | SYSTOLIC BLOOD PRESSURE: 130 MMHG | DIASTOLIC BLOOD PRESSURE: 72 MMHG | TEMPERATURE: 98 F

## 2020-03-23 DIAGNOSIS — I83.029 VENOUS ULCER OF LEFT LOWER EXTREMITY WITH VARICOSE VEINS: ICD-10-CM

## 2020-03-23 DIAGNOSIS — L97.929 VENOUS ULCER OF LEFT LOWER EXTREMITY WITH VARICOSE VEINS: ICD-10-CM

## 2020-03-23 PROCEDURE — 11042 DBRDMT SUBQ TIS 1ST 20SQCM/<: CPT

## 2020-03-23 PROCEDURE — 27201912 HC WOUND CARE DEBRIDEMENT SUPPLIES

## 2020-03-23 PROCEDURE — 99499 NO LOS: ICD-10-PCS | Mod: ,,, | Performed by: SURGERY

## 2020-03-23 PROCEDURE — 99499 UNLISTED E&M SERVICE: CPT | Mod: ,,, | Performed by: SURGERY

## 2020-03-23 NOTE — ASSESSMENT & PLAN NOTE
Wound shows marked improvement.  Wound mostly granulating.  Significant epithelialization at the wound edges.  Edema control.  Continue current management with compression and eluxtra dressing

## 2020-03-26 ENCOUNTER — CLINICAL SUPPORT (OUTPATIENT)
Dept: WOUND CARE | Facility: HOSPITAL | Age: 80
End: 2020-03-26
Attending: SURGERY
Payer: MEDICARE

## 2020-03-26 VITALS
DIASTOLIC BLOOD PRESSURE: 74 MMHG | HEART RATE: 67 BPM | SYSTOLIC BLOOD PRESSURE: 154 MMHG | RESPIRATION RATE: 19 BRPM | TEMPERATURE: 98 F

## 2020-03-26 DIAGNOSIS — I87.332 CHRONIC VENOUS HYPERTENSION (IDIOPATHIC) WITH ULCER AND INFLAMMATION OF LEFT LOWER EXTREMITY: ICD-10-CM

## 2020-03-26 DIAGNOSIS — L97.322 NON-PRESSURE CHRONIC ULCER OF LEFT ANKLE WITH FAT LAYER EXPOSED: ICD-10-CM

## 2020-03-26 PROCEDURE — 29581 APPL MULTLAYER CMPRN SYS LEG: CPT | Mod: LT

## 2020-03-30 ENCOUNTER — OFFICE VISIT (OUTPATIENT)
Dept: WOUND CARE | Facility: HOSPITAL | Age: 80
End: 2020-03-30
Attending: SURGERY
Payer: MEDICARE

## 2020-03-30 DIAGNOSIS — L97.322 NON-PRESSURE CHRONIC ULCER OF LEFT ANKLE WITH FAT LAYER EXPOSED: Primary | ICD-10-CM

## 2020-03-30 DIAGNOSIS — L97.929 VENOUS ULCER OF LEFT LOWER EXTREMITY WITH VARICOSE VEINS: ICD-10-CM

## 2020-03-30 DIAGNOSIS — I87.332 CHRONIC VENOUS HYPERTENSION (IDIOPATHIC) WITH ULCER AND INFLAMMATION OF LEFT LOWER EXTREMITY: ICD-10-CM

## 2020-03-30 DIAGNOSIS — I83.029 VENOUS ULCER OF LEFT LOWER EXTREMITY WITH VARICOSE VEINS: ICD-10-CM

## 2020-03-30 PROCEDURE — 27201912 HC WOUND CARE DEBRIDEMENT SUPPLIES

## 2020-03-30 PROCEDURE — 11042 DBRDMT SUBQ TIS 1ST 20SQCM/<: CPT

## 2020-03-30 PROCEDURE — 99499 UNLISTED E&M SERVICE: CPT | Mod: ,,, | Performed by: SURGERY

## 2020-03-30 PROCEDURE — 99499 NO LOS: ICD-10-PCS | Mod: ,,, | Performed by: SURGERY

## 2020-03-30 NOTE — PROGRESS NOTES
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  Patient states the wound has been present for several weeks.  Patient says he has had previous ulcers on the right lower extremity.  Patient denies any previous history of a deep vein thrombosis.  Patient denies any lower extremity swelling. Patient says he has been putting over-the-counter cream on the wound for the past several weeks.  Patient denies any significant drainage.  He denies any significant pain fever or chills.  Patient gives a history of blockages in the lower extremities.  Patient says he had a stent placed in the right lower extremity February of 2019.  Patient does smoke daily.  Sharp debridement performed. Enluxtra and 2 layer compression.  SYLVIA is 0.93 on the left lower extremity. Based on his SYLVIA, arterial circulation is adequate for compression.         Current Assessment & Plan     Wound continues to make excellent progress.  Continue debridement to maintain active phase wound healing.  Continue compression therapy.         Non-pressure chronic ulcer of left ankle with fat layer exposed - Primary    Chronic venous hypertension (idiopathic) with ulcer and inflammation of left lower extremity    Overview     Patient with long-time history of chronic venous hypertension and prior venous ulcerations.  He has utilized stockings in the past but has not utilized stockings recently.  Patient underwent venous ablation with Dr. Barnes of left leg 12/18/19.               See wound doc progress notes. Documents will be scanned.        Arvin Crowe  [facility]

## 2020-03-30 NOTE — ASSESSMENT & PLAN NOTE
Wound continues to make excellent progress.  Continue debridement to maintain active phase wound healing.  Continue compression therapy.

## 2020-04-02 ENCOUNTER — CLINICAL SUPPORT (OUTPATIENT)
Dept: WOUND CARE | Facility: HOSPITAL | Age: 80
End: 2020-04-02
Attending: SURGERY
Payer: MEDICARE

## 2020-04-02 VITALS
DIASTOLIC BLOOD PRESSURE: 77 MMHG | RESPIRATION RATE: 18 BRPM | TEMPERATURE: 98 F | SYSTOLIC BLOOD PRESSURE: 165 MMHG | HEART RATE: 70 BPM

## 2020-04-02 DIAGNOSIS — L97.322 NON-PRESSURE CHRONIC ULCER OF LEFT ANKLE WITH FAT LAYER EXPOSED: Primary | ICD-10-CM

## 2020-04-02 DIAGNOSIS — I87.332 CHRONIC VENOUS HYPERTENSION (IDIOPATHIC) WITH ULCER AND INFLAMMATION OF LEFT LOWER EXTREMITY: ICD-10-CM

## 2020-04-02 PROCEDURE — 29581 APPL MULTLAYER CMPRN SYS LEG: CPT | Mod: LT

## 2020-04-06 ENCOUNTER — OFFICE VISIT (OUTPATIENT)
Dept: WOUND CARE | Facility: HOSPITAL | Age: 80
End: 2020-04-06
Attending: SURGERY
Payer: MEDICARE

## 2020-04-06 VITALS
HEART RATE: 68 BPM | DIASTOLIC BLOOD PRESSURE: 81 MMHG | RESPIRATION RATE: 18 BRPM | SYSTOLIC BLOOD PRESSURE: 163 MMHG | TEMPERATURE: 98 F

## 2020-04-06 DIAGNOSIS — I83.029 VENOUS ULCER OF LEFT LOWER EXTREMITY WITH VARICOSE VEINS: ICD-10-CM

## 2020-04-06 DIAGNOSIS — L97.929 VENOUS ULCER OF LEFT LOWER EXTREMITY WITH VARICOSE VEINS: ICD-10-CM

## 2020-04-06 DIAGNOSIS — I87.332 CHRONIC VENOUS HYPERTENSION (IDIOPATHIC) WITH ULCER AND INFLAMMATION OF LEFT LOWER EXTREMITY: ICD-10-CM

## 2020-04-06 DIAGNOSIS — L97.322 NON-PRESSURE CHRONIC ULCER OF LEFT ANKLE WITH FAT LAYER EXPOSED: Primary | ICD-10-CM

## 2020-04-06 PROCEDURE — 99499 NO LOS: ICD-10-PCS | Mod: ,,, | Performed by: SURGERY

## 2020-04-06 PROCEDURE — 27201912 HC WOUND CARE DEBRIDEMENT SUPPLIES

## 2020-04-06 PROCEDURE — 99499 UNLISTED E&M SERVICE: CPT | Mod: ,,, | Performed by: SURGERY

## 2020-04-06 PROCEDURE — 11042 DBRDMT SUBQ TIS 1ST 20SQCM/<: CPT

## 2020-04-06 NOTE — PROGRESS NOTES
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  Patient states the wound has been present for several weeks.  Patient says he has had previous ulcers on the right lower extremity.  Patient denies any previous history of a deep vein thrombosis.  Patient denies any lower extremity swelling. Patient says he has been putting over-the-counter cream on the wound for the past several weeks.  Patient denies any significant drainage.  He denies any significant pain fever or chills.  Patient gives a history of blockages in the lower extremities.  Patient says he had a stent placed in the right lower extremity February of 2019.  Patient does smoke daily.  Sharp debridement performed. Enluxtra and 2 layer compression.  SYLVIA is 0.93 on the left lower extremity. Based on his SYLVIA, arterial circulation is adequate for compression.         Current Assessment & Plan     Wound shows marked improvement.  Wound mostly granulating.  Significant epithelialization at the wound edges.  Edema control.  Continue current management with compression and eluxtra dressing               See wound doc progress notes. Documents will be scanned.        Travis Galvan  [facility]

## 2020-04-06 NOTE — PROGRESS NOTES
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  Patient states the wound has been present for several weeks.  Patient says he has had previous ulcers on the right lower extremity.  Patient denies any previous history of a deep vein thrombosis.  Patient denies any lower extremity swelling. Patient says he has been putting over-the-counter cream on the wound for the past several weeks.  Patient denies any significant drainage.  He denies any significant pain fever or chills.  Patient gives a history of blockages in the lower extremities.  Patient says he had a stent placed in the right lower extremity February of 2019.  Patient does smoke daily.  Sharp debridement performed. Enluxtra and 2 layer compression.  SYLVIA is 0.93 on the left lower extremity. Based on his SYLVIA, arterial circulation is adequate for compression.         Non-pressure chronic ulcer of left ankle with fat layer exposed - Primary    Current Assessment & Plan     Wound is markedly improved.  Continue debridement to maintain active phase wound healing.  Continue compression therapy.         Chronic venous hypertension (idiopathic) with ulcer and inflammation of left lower extremity    Overview     Patient with long-time history of chronic venous hypertension and prior venous ulcerations.  He has utilized stockings in the past but has not utilized stockings recently.  Patient underwent venous ablation with Dr. Barnes of left leg 12/18/19.               See wound doc progress notes. Documents will be scanned.        Arvin Crowe  [facility]

## 2020-04-06 NOTE — ASSESSMENT & PLAN NOTE
Wound is markedly improved.  Continue debridement to maintain active phase wound healing.  Continue compression therapy.

## 2020-04-09 ENCOUNTER — CLINICAL SUPPORT (OUTPATIENT)
Dept: WOUND CARE | Facility: HOSPITAL | Age: 80
End: 2020-04-09
Attending: SURGERY
Payer: MEDICARE

## 2020-04-09 VITALS
HEART RATE: 78 BPM | SYSTOLIC BLOOD PRESSURE: 150 MMHG | TEMPERATURE: 98 F | DIASTOLIC BLOOD PRESSURE: 81 MMHG | RESPIRATION RATE: 18 BRPM

## 2020-04-09 DIAGNOSIS — I87.332 CHRONIC VENOUS HYPERTENSION (IDIOPATHIC) WITH ULCER AND INFLAMMATION OF LEFT LOWER EXTREMITY: ICD-10-CM

## 2020-04-09 DIAGNOSIS — L97.322 NON-PRESSURE CHRONIC ULCER OF LEFT ANKLE WITH FAT LAYER EXPOSED: ICD-10-CM

## 2020-04-09 PROCEDURE — 29581 APPL MULTLAYER CMPRN SYS LEG: CPT | Mod: LT

## 2020-04-13 ENCOUNTER — OFFICE VISIT (OUTPATIENT)
Dept: WOUND CARE | Facility: HOSPITAL | Age: 80
End: 2020-04-13
Attending: SURGERY
Payer: MEDICARE

## 2020-04-13 VITALS
RESPIRATION RATE: 16 BRPM | TEMPERATURE: 98 F | SYSTOLIC BLOOD PRESSURE: 153 MMHG | DIASTOLIC BLOOD PRESSURE: 74 MMHG | HEART RATE: 75 BPM

## 2020-04-13 DIAGNOSIS — I83.029 VENOUS ULCER OF LEFT LOWER EXTREMITY WITH VARICOSE VEINS: ICD-10-CM

## 2020-04-13 DIAGNOSIS — L97.929 VENOUS ULCER OF LEFT LOWER EXTREMITY WITH VARICOSE VEINS: ICD-10-CM

## 2020-04-13 PROCEDURE — 99499 UNLISTED E&M SERVICE: CPT | Mod: ,,, | Performed by: SURGERY

## 2020-04-13 PROCEDURE — 99499 NO LOS: ICD-10-PCS | Mod: ,,, | Performed by: SURGERY

## 2020-04-13 PROCEDURE — 11042 DBRDMT SUBQ TIS 1ST 20SQCM/<: CPT

## 2020-04-13 PROCEDURE — 27201912 HC WOUND CARE DEBRIDEMENT SUPPLIES

## 2020-04-13 NOTE — PROGRESS NOTES
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  Patient states the wound has been present for several weeks.  Patient says he has had previous ulcers on the right lower extremity.  Patient denies any previous history of a deep vein thrombosis.  Patient denies any lower extremity swelling. Patient says he has been putting over-the-counter cream on the wound for the past several weeks.  Patient denies any significant drainage.  He denies any significant pain fever or chills.  Patient gives a history of blockages in the lower extremities.  Patient says he had a stent placed in the right lower extremity February of 2019.  Patient does smoke daily.  Sharp debridement performed. Enluxtra and 2 layer compression.  SYLVIA is 0.93 on the left lower extremity. Based on his SYLVIA, arterial circulation is adequate for compression.         Current Assessment & Plan     Wound slowly improving.  Wound bed granulating.  Epithelialization of the wound edge.  Edema well controlled.  Will change to collagen dressing continue compression wraps               See wound doc progress notes. Documents will be scanned.        Travis Galvan  [facility]

## 2020-04-13 NOTE — ASSESSMENT & PLAN NOTE
Wound slowly improving.  Wound bed granulating.  Epithelialization of the wound edge.  Edema well controlled.  Will change to collagen dressing continue compression wraps

## 2020-04-16 ENCOUNTER — CLINICAL SUPPORT (OUTPATIENT)
Dept: WOUND CARE | Facility: HOSPITAL | Age: 80
End: 2020-04-16
Attending: SURGERY
Payer: MEDICARE

## 2020-04-16 VITALS
HEART RATE: 75 BPM | TEMPERATURE: 98 F | DIASTOLIC BLOOD PRESSURE: 79 MMHG | SYSTOLIC BLOOD PRESSURE: 127 MMHG | RESPIRATION RATE: 18 BRPM

## 2020-04-16 DIAGNOSIS — L97.322 NON-PRESSURE CHRONIC ULCER OF LEFT ANKLE WITH FAT LAYER EXPOSED: ICD-10-CM

## 2020-04-16 PROCEDURE — 29581 APPL MULTLAYER CMPRN SYS LEG: CPT | Mod: LT

## 2020-04-20 ENCOUNTER — OFFICE VISIT (OUTPATIENT)
Dept: WOUND CARE | Facility: HOSPITAL | Age: 80
End: 2020-04-20
Attending: SURGERY
Payer: MEDICARE

## 2020-04-20 VITALS
SYSTOLIC BLOOD PRESSURE: 145 MMHG | RESPIRATION RATE: 18 BRPM | HEART RATE: 76 BPM | TEMPERATURE: 97 F | DIASTOLIC BLOOD PRESSURE: 80 MMHG

## 2020-04-20 DIAGNOSIS — I83.029 VENOUS ULCER OF LEFT LOWER EXTREMITY WITH VARICOSE VEINS: ICD-10-CM

## 2020-04-20 DIAGNOSIS — L97.322 NON-PRESSURE CHRONIC ULCER OF LEFT ANKLE WITH FAT LAYER EXPOSED: Primary | ICD-10-CM

## 2020-04-20 DIAGNOSIS — I87.332 CHRONIC VENOUS HYPERTENSION (IDIOPATHIC) WITH ULCER AND INFLAMMATION OF LEFT LOWER EXTREMITY: ICD-10-CM

## 2020-04-20 DIAGNOSIS — L97.929 VENOUS ULCER OF LEFT LOWER EXTREMITY WITH VARICOSE VEINS: ICD-10-CM

## 2020-04-20 PROCEDURE — 99499 NO LOS: ICD-10-PCS | Mod: ,,, | Performed by: SURGERY

## 2020-04-20 PROCEDURE — 27201912 HC WOUND CARE DEBRIDEMENT SUPPLIES

## 2020-04-20 PROCEDURE — 99499 UNLISTED E&M SERVICE: CPT | Mod: ,,, | Performed by: SURGERY

## 2020-04-20 PROCEDURE — 11042 DBRDMT SUBQ TIS 1ST 20SQCM/<: CPT

## 2020-04-20 NOTE — PROGRESS NOTES
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  Patient states the wound has been present for several weeks.  Patient says he has had previous ulcers on the right lower extremity.  Patient denies any previous history of a deep vein thrombosis.  Patient denies any lower extremity swelling. Patient says he has been putting over-the-counter cream on the wound for the past several weeks.  Patient denies any significant drainage.  He denies any significant pain fever or chills.  Patient gives a history of blockages in the lower extremities.  Patient says he had a stent placed in the right lower extremity February of 2019.  Patient does smoke daily.  Sharp debridement performed. Enluxtra and 2 layer compression.  SYLVIA is 0.93 on the left lower extremity. Based on his SYLVIA, arterial circulation is adequate for compression.         Non-pressure chronic ulcer of left ankle with fat layer exposed - Primary    Current Assessment & Plan     Wound is improving dramatically.  Continue debridement to maintain active phase wound healing.  Continue compression therapy.         Chronic venous hypertension (idiopathic) with ulcer and inflammation of left lower extremity    Overview     Patient with long-time history of chronic venous hypertension and prior venous ulcerations.  He has utilized stockings in the past but has not utilized stockings recently.  Patient underwent venous ablation with Dr. Barnes of left leg 12/18/19.               See wound doc progress notes. Documents will be scanned.        Arvin Crowe  [facility]

## 2020-04-20 NOTE — ASSESSMENT & PLAN NOTE
Wound is improving dramatically.  Continue debridement to maintain active phase wound healing.  Continue compression therapy.

## 2020-04-23 ENCOUNTER — CLINICAL SUPPORT (OUTPATIENT)
Dept: WOUND CARE | Facility: HOSPITAL | Age: 80
End: 2020-04-23
Attending: SURGERY
Payer: MEDICARE

## 2020-04-23 VITALS
HEART RATE: 84 BPM | RESPIRATION RATE: 18 BRPM | DIASTOLIC BLOOD PRESSURE: 75 MMHG | TEMPERATURE: 98 F | SYSTOLIC BLOOD PRESSURE: 164 MMHG

## 2020-04-23 DIAGNOSIS — L97.322 NON-PRESSURE CHRONIC ULCER OF LEFT ANKLE WITH FAT LAYER EXPOSED: Primary | ICD-10-CM

## 2020-04-23 DIAGNOSIS — I87.332 CHRONIC VENOUS HYPERTENSION (IDIOPATHIC) WITH ULCER AND INFLAMMATION OF LEFT LOWER EXTREMITY: ICD-10-CM

## 2020-04-23 PROCEDURE — 29581 APPL MULTLAYER CMPRN SYS LEG: CPT | Mod: LT

## 2020-04-27 ENCOUNTER — OFFICE VISIT (OUTPATIENT)
Dept: WOUND CARE | Facility: HOSPITAL | Age: 80
End: 2020-04-27
Attending: SURGERY
Payer: MEDICARE

## 2020-04-27 VITALS — DIASTOLIC BLOOD PRESSURE: 83 MMHG | TEMPERATURE: 98 F | HEART RATE: 76 BPM | SYSTOLIC BLOOD PRESSURE: 143 MMHG

## 2020-04-27 DIAGNOSIS — L97.929 VENOUS ULCER OF LEFT LOWER EXTREMITY WITH VARICOSE VEINS: ICD-10-CM

## 2020-04-27 DIAGNOSIS — I83.029 VENOUS ULCER OF LEFT LOWER EXTREMITY WITH VARICOSE VEINS: ICD-10-CM

## 2020-04-27 PROCEDURE — 99499 NO LOS: ICD-10-PCS | Mod: ,,, | Performed by: SURGERY

## 2020-04-27 PROCEDURE — 27201912 HC WOUND CARE DEBRIDEMENT SUPPLIES

## 2020-04-27 PROCEDURE — 99499 UNLISTED E&M SERVICE: CPT | Mod: ,,, | Performed by: SURGERY

## 2020-04-27 PROCEDURE — 11042 DBRDMT SUBQ TIS 1ST 20SQCM/<: CPT

## 2020-04-27 NOTE — ASSESSMENT & PLAN NOTE
Wound slowly improving.  Wound superficial.  Wound mostly granulating.  Epithelialization of the wound edges.  Continue collagen dressing and compression.

## 2020-04-30 ENCOUNTER — CLINICAL SUPPORT (OUTPATIENT)
Dept: WOUND CARE | Facility: HOSPITAL | Age: 80
End: 2020-04-30
Attending: SURGERY
Payer: MEDICARE

## 2020-04-30 VITALS — DIASTOLIC BLOOD PRESSURE: 92 MMHG | SYSTOLIC BLOOD PRESSURE: 176 MMHG | HEART RATE: 89 BPM | TEMPERATURE: 99 F

## 2020-04-30 DIAGNOSIS — L97.322 NON-PRESSURE CHRONIC ULCER OF LEFT ANKLE WITH FAT LAYER EXPOSED: ICD-10-CM

## 2020-04-30 DIAGNOSIS — I83.029 VENOUS ULCER OF LEFT LOWER EXTREMITY WITH VARICOSE VEINS: Primary | ICD-10-CM

## 2020-04-30 DIAGNOSIS — L97.929 VENOUS ULCER OF LEFT LOWER EXTREMITY WITH VARICOSE VEINS: Primary | ICD-10-CM

## 2020-04-30 PROCEDURE — 29581 APPL MULTLAYER CMPRN SYS LEG: CPT | Mod: RT

## 2020-05-04 ENCOUNTER — OFFICE VISIT (OUTPATIENT)
Dept: WOUND CARE | Facility: HOSPITAL | Age: 80
End: 2020-05-04
Attending: SURGERY
Payer: MEDICARE

## 2020-05-04 VITALS
DIASTOLIC BLOOD PRESSURE: 80 MMHG | TEMPERATURE: 98 F | SYSTOLIC BLOOD PRESSURE: 138 MMHG | HEART RATE: 77 BPM | RESPIRATION RATE: 20 BRPM

## 2020-05-04 DIAGNOSIS — L97.929 VENOUS ULCER OF LEFT LOWER EXTREMITY WITH VARICOSE VEINS: Primary | ICD-10-CM

## 2020-05-04 DIAGNOSIS — L97.322 NON-PRESSURE CHRONIC ULCER OF LEFT ANKLE WITH FAT LAYER EXPOSED: ICD-10-CM

## 2020-05-04 DIAGNOSIS — I87.332 CHRONIC VENOUS HYPERTENSION (IDIOPATHIC) WITH ULCER AND INFLAMMATION OF LEFT LOWER EXTREMITY: ICD-10-CM

## 2020-05-04 DIAGNOSIS — I83.029 VENOUS ULCER OF LEFT LOWER EXTREMITY WITH VARICOSE VEINS: Primary | ICD-10-CM

## 2020-05-04 PROCEDURE — 27201912 HC WOUND CARE DEBRIDEMENT SUPPLIES

## 2020-05-04 PROCEDURE — 99499 UNLISTED E&M SERVICE: CPT | Mod: ,,, | Performed by: SURGERY

## 2020-05-04 PROCEDURE — 11042 DBRDMT SUBQ TIS 1ST 20SQCM/<: CPT

## 2020-05-04 PROCEDURE — 99499 NO LOS: ICD-10-PCS | Mod: ,,, | Performed by: SURGERY

## 2020-05-04 NOTE — PROGRESS NOTES
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  Patient states the wound has been present for several weeks.  Patient says he has had previous ulcers on the right lower extremity.  Patient denies any previous history of a deep vein thrombosis.  Patient denies any lower extremity swelling. Patient says he has been putting over-the-counter cream on the wound for the past several weeks.  Patient denies any significant drainage.  He denies any significant pain fever or chills.  Patient gives a history of blockages in the lower extremities.  Patient says he had a stent placed in the right lower extremity February of 2019.  Patient does smoke daily.  Sharp debridement performed. Enluxtra and 2 layer compression.  SYLVIA is 0.93 on the left lower extremity. Based on his SYLVIA, arterial circulation is adequate for compression.         Non-pressure chronic ulcer of left ankle with fat layer exposed    Current Assessment & Plan     Wound continues to progress well.  Continue debridement to maintain active phase wound healing.  Continue 2 layer compression therapy.         Chronic venous hypertension (idiopathic) with ulcer and inflammation of left lower extremity    Overview     Patient with long-time history of chronic venous hypertension and prior venous ulcerations.  He has utilized stockings in the past but has not utilized stockings recently.  Patient underwent venous ablation with Dr. Barnes of left leg 12/18/19.               See wound doc progress notes. Documents will be scanned.        Arvin Crowe  [facility]

## 2020-05-04 NOTE — ASSESSMENT & PLAN NOTE
Wound continues to progress well.  Continue debridement to maintain active phase wound healing.  Continue 2 layer compression therapy.

## 2020-05-07 ENCOUNTER — CLINICAL SUPPORT (OUTPATIENT)
Dept: WOUND CARE | Facility: HOSPITAL | Age: 80
End: 2020-05-07
Attending: SURGERY
Payer: MEDICARE

## 2020-05-07 VITALS
DIASTOLIC BLOOD PRESSURE: 77 MMHG | RESPIRATION RATE: 18 BRPM | SYSTOLIC BLOOD PRESSURE: 137 MMHG | TEMPERATURE: 99 F | HEART RATE: 77 BPM

## 2020-05-07 DIAGNOSIS — I87.332 CHRONIC VENOUS HYPERTENSION (IDIOPATHIC) WITH ULCER AND INFLAMMATION OF LEFT LOWER EXTREMITY: ICD-10-CM

## 2020-05-07 DIAGNOSIS — L97.322 NON-PRESSURE CHRONIC ULCER OF LEFT ANKLE WITH FAT LAYER EXPOSED: Primary | ICD-10-CM

## 2020-05-07 PROCEDURE — 29581 APPL MULTLAYER CMPRN SYS LEG: CPT | Mod: LT

## 2020-05-11 ENCOUNTER — OFFICE VISIT (OUTPATIENT)
Dept: WOUND CARE | Facility: HOSPITAL | Age: 80
End: 2020-05-11
Attending: SURGERY
Payer: MEDICARE

## 2020-05-11 VITALS — TEMPERATURE: 97 F

## 2020-05-11 DIAGNOSIS — L97.929 VENOUS ULCER OF LEFT LOWER EXTREMITY WITH VARICOSE VEINS: ICD-10-CM

## 2020-05-11 DIAGNOSIS — I83.029 VENOUS ULCER OF LEFT LOWER EXTREMITY WITH VARICOSE VEINS: ICD-10-CM

## 2020-05-11 PROCEDURE — 27201912 HC WOUND CARE DEBRIDEMENT SUPPLIES

## 2020-05-11 PROCEDURE — 99499 UNLISTED E&M SERVICE: CPT | Mod: ,,, | Performed by: SURGERY

## 2020-05-11 PROCEDURE — 99499 NO LOS: ICD-10-PCS | Mod: ,,, | Performed by: SURGERY

## 2020-05-11 PROCEDURE — 11042 DBRDMT SUBQ TIS 1ST 20SQCM/<: CPT

## 2020-05-11 NOTE — ASSESSMENT & PLAN NOTE
Wound continues to improve.  Wound clean and mostly granulating.  There is epithelialization at the wound edges.  The periwound area at his desiccated and is flaking. stop gentian violet and implement a moisture cream.  Edema is well controlled will try a Tubigrip rather than two layer compression

## 2020-05-18 ENCOUNTER — OFFICE VISIT (OUTPATIENT)
Dept: WOUND CARE | Facility: HOSPITAL | Age: 80
End: 2020-05-18
Attending: SURGERY
Payer: MEDICARE

## 2020-05-18 VITALS — DIASTOLIC BLOOD PRESSURE: 83 MMHG | SYSTOLIC BLOOD PRESSURE: 160 MMHG | HEART RATE: 80 BPM

## 2020-05-18 DIAGNOSIS — I87.332 CHRONIC VENOUS HYPERTENSION (IDIOPATHIC) WITH ULCER AND INFLAMMATION OF LEFT LOWER EXTREMITY: ICD-10-CM

## 2020-05-18 DIAGNOSIS — L97.929 VENOUS ULCER OF LEFT LOWER EXTREMITY WITH VARICOSE VEINS: ICD-10-CM

## 2020-05-18 DIAGNOSIS — I83.029 VENOUS ULCER OF LEFT LOWER EXTREMITY WITH VARICOSE VEINS: ICD-10-CM

## 2020-05-18 DIAGNOSIS — L97.322 NON-PRESSURE CHRONIC ULCER OF LEFT ANKLE WITH FAT LAYER EXPOSED: Primary | ICD-10-CM

## 2020-05-18 PROCEDURE — 27201912 HC WOUND CARE DEBRIDEMENT SUPPLIES

## 2020-05-18 PROCEDURE — 99499 UNLISTED E&M SERVICE: CPT | Mod: ,,, | Performed by: SURGERY

## 2020-05-18 PROCEDURE — 99499 NO LOS: ICD-10-PCS | Mod: ,,, | Performed by: SURGERY

## 2020-05-18 PROCEDURE — 11042 DBRDMT SUBQ TIS 1ST 20SQCM/<: CPT

## 2020-05-18 NOTE — ASSESSMENT & PLAN NOTE
Minus setback with the wound.  Patient is leg did swell with Tubigrip.  There was increased drainage and increase size of the wound.  Continue debridement to maintain active phase wound healing.  Re-initiate 2 layer compression.  Patient will need 20-30 mm compressive stockings when wound healed and 2 layer compression stopped.

## 2020-05-18 NOTE — PROGRESS NOTES
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  Patient states the wound has been present for several weeks.  Patient says he has had previous ulcers on the right lower extremity.  Patient denies any previous history of a deep vein thrombosis.  Patient denies any lower extremity swelling. Patient says he has been putting over-the-counter cream on the wound for the past several weeks.  Patient denies any significant drainage.  He denies any significant pain fever or chills.  Patient gives a history of blockages in the lower extremities.  Patient says he had a stent placed in the right lower extremity February of 2019.  Patient does smoke daily.  Sharp debridement performed. Enluxtra and 2 layer compression.  SYLVIA is 0.93 on the left lower extremity. Based on his SYLVIA, arterial circulation is adequate for compression.         Current Assessment & Plan     Minus setback with the wound.  Patient is leg did swell with Tubigrip.  There was increased drainage and increase size of the wound.  Continue debridement to maintain active phase wound healing.  Re-initiate 2 layer compression.  Patient will need 20-30 mm compressive stockings when wound healed and 2 layer compression stopped.         Non-pressure chronic ulcer of left ankle with fat layer exposed - Primary    Chronic venous hypertension (idiopathic) with ulcer and inflammation of left lower extremity    Overview     Patient with long-time history of chronic venous hypertension and prior venous ulcerations.  He has utilized stockings in the past but has not utilized stockings recently.  Patient underwent venous ablation with Dr. Barnes of left leg 12/18/19.               See wound doc progress notes.  Documents will be scanned.        Arvin Crowe  [facility]

## 2020-05-25 ENCOUNTER — OFFICE VISIT (OUTPATIENT)
Dept: WOUND CARE | Facility: HOSPITAL | Age: 80
End: 2020-05-25
Attending: SURGERY
Payer: MEDICARE

## 2020-05-25 VITALS
SYSTOLIC BLOOD PRESSURE: 167 MMHG | DIASTOLIC BLOOD PRESSURE: 81 MMHG | TEMPERATURE: 98 F | HEART RATE: 91 BPM | RESPIRATION RATE: 18 BRPM

## 2020-05-25 DIAGNOSIS — L97.929 VENOUS ULCER OF LEFT LOWER EXTREMITY WITH VARICOSE VEINS: ICD-10-CM

## 2020-05-25 DIAGNOSIS — I83.029 VENOUS ULCER OF LEFT LOWER EXTREMITY WITH VARICOSE VEINS: ICD-10-CM

## 2020-05-25 PROCEDURE — 99499 NO LOS: ICD-10-PCS | Mod: ,,, | Performed by: SURGERY

## 2020-05-25 PROCEDURE — 11042 DBRDMT SUBQ TIS 1ST 20SQCM/<: CPT

## 2020-05-25 PROCEDURE — 99499 UNLISTED E&M SERVICE: CPT | Mod: ,,, | Performed by: SURGERY

## 2020-05-25 PROCEDURE — 27201912 HC WOUND CARE DEBRIDEMENT SUPPLIES

## 2020-05-25 NOTE — PROGRESS NOTES
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  Patient states the wound has been present for several weeks.  Patient says he has had previous ulcers on the right lower extremity.  Patient denies any previous history of a deep vein thrombosis.  Patient denies any lower extremity swelling. Patient says he has been putting over-the-counter cream on the wound for the past several weeks.  Patient denies any significant drainage.  He denies any significant pain fever or chills.  Patient gives a history of blockages in the lower extremities.  Patient says he had a stent placed in the right lower extremity February of 2019.  Patient does smoke daily.  Sharp debridement performed. Enluxtra and 2 layer compression.  SYLVIA is 0.93 on the left lower extremity. Based on his SYLVIA, arterial circulation is adequate for compression.         Current Assessment & Plan     Swelling and periwound maceration better today since free implementing to wear compression.  Also remains fairly clean.  Continue Mepilex Ag gentian violet for miesha wound area and 2 layer compression.               See wound doc progress notes. Documents will be scanned.        Travis Galvan  [facility]

## 2020-05-25 NOTE — PROGRESS NOTES
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  Patient states the wound has been present for several weeks.  Patient says he has had previous ulcers on the right lower extremity.  Patient denies any previous history of a deep vein thrombosis.  Patient denies any lower extremity swelling. Patient says he has been putting over-the-counter cream on the wound for the past several weeks.  Patient denies any significant drainage.  He denies any significant pain fever or chills.  Patient gives a history of blockages in the lower extremities.  Patient says he had a stent placed in the right lower extremity February of 2019.  Patient does smoke daily.  Sharp debridement performed. Enluxtra and 2 layer compression.  SYLVIA is 0.93 on the left lower extremity. Based on his SYLVIA, arterial circulation is adequate for compression.         Current Assessment & Plan     Wound continues to improve.  Wound clean and mostly granulating.  There is epithelialization at the wound edges.  The periwound area at his desiccated and is flaking. stop gentian violet and implement a moisture cream.  Edema is well controlled will try a Tubigrip rather than two layer compression               See wound doc progress notes. Documents will be scanned.        Travis Galvan  [facility]  
See wound care assessment documentation in chart review. Scanned under media tab.     
no weight change, no fever or chills  no rash, no bruises  no visual changes no eye discharge  no cough cold or congestion,   no sob, + chest pain  no orthopnea, no pnd  no abd pain, no n/v/d  no hematuria, no change in urinary habits  no joint pain, no deformity  no headache, no paresthesia

## 2020-06-01 ENCOUNTER — OFFICE VISIT (OUTPATIENT)
Dept: WOUND CARE | Facility: HOSPITAL | Age: 80
End: 2020-06-01
Attending: SURGERY
Payer: MEDICARE

## 2020-06-01 VITALS — SYSTOLIC BLOOD PRESSURE: 158 MMHG | TEMPERATURE: 98 F | DIASTOLIC BLOOD PRESSURE: 90 MMHG | HEART RATE: 86 BPM

## 2020-06-01 DIAGNOSIS — I83.029 VENOUS ULCER OF LEFT LOWER EXTREMITY WITH VARICOSE VEINS: ICD-10-CM

## 2020-06-01 DIAGNOSIS — L97.929 VENOUS ULCER OF LEFT LOWER EXTREMITY WITH VARICOSE VEINS: ICD-10-CM

## 2020-06-01 PROCEDURE — 27201912 HC WOUND CARE DEBRIDEMENT SUPPLIES

## 2020-06-01 PROCEDURE — 99499 UNLISTED E&M SERVICE: CPT | Mod: ,,, | Performed by: SURGERY

## 2020-06-01 PROCEDURE — 99499 NO LOS: ICD-10-PCS | Mod: ,,, | Performed by: SURGERY

## 2020-06-01 PROCEDURE — 11042 DBRDMT SUBQ TIS 1ST 20SQCM/<: CPT

## 2020-06-01 NOTE — PROGRESS NOTES
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  Patient states the wound has been present for several weeks.  Patient says he has had previous ulcers on the right lower extremity.  Patient denies any previous history of a deep vein thrombosis.  Patient denies any lower extremity swelling. Patient says he has been putting over-the-counter cream on the wound for the past several weeks.  Patient denies any significant drainage.  He denies any significant pain fever or chills.  Patient gives a history of blockages in the lower extremities.  Patient says he had a stent placed in the right lower extremity February of 2019.  Patient does smoke daily.  Sharp debridement performed. Enluxtra and 2 layer compression.  SYLVIA is 0.93 on the left lower extremity. Based on his YSLVIA, arterial circulation is adequate for compression.         Current Assessment & Plan     Patient with persistent increased drainage.  There is periwound maceration.  Will stop Mepilex Ag.  Will also restart dressing changes twice weekly.               See wound doc progress notes. Documents will be scanned.        Travis Galvan  [facility]

## 2020-06-04 ENCOUNTER — CLINICAL SUPPORT (OUTPATIENT)
Dept: WOUND CARE | Facility: HOSPITAL | Age: 80
End: 2020-06-04
Attending: SURGERY
Payer: MEDICARE

## 2020-06-04 VITALS
SYSTOLIC BLOOD PRESSURE: 163 MMHG | RESPIRATION RATE: 18 BRPM | HEART RATE: 80 BPM | DIASTOLIC BLOOD PRESSURE: 86 MMHG | TEMPERATURE: 98 F

## 2020-06-04 DIAGNOSIS — I87.332 IDIOPATHIC CHRONIC VENOUS HYPERTENSION OF LEFT LOWER EXTREMITY WITH ULCER AND INFLAMMATION: ICD-10-CM

## 2020-06-04 DIAGNOSIS — L97.322 NON-PRESSURE CHRONIC ULCER OF LEFT ANKLE WITH FAT LAYER EXPOSED: Primary | ICD-10-CM

## 2020-06-04 PROCEDURE — 29581 APPL MULTLAYER CMPRN SYS LEG: CPT | Mod: LT

## 2020-06-08 ENCOUNTER — OFFICE VISIT (OUTPATIENT)
Dept: WOUND CARE | Facility: HOSPITAL | Age: 80
End: 2020-06-08
Attending: SURGERY
Payer: MEDICARE

## 2020-06-08 VITALS — DIASTOLIC BLOOD PRESSURE: 88 MMHG | TEMPERATURE: 98 F | HEART RATE: 86 BPM | SYSTOLIC BLOOD PRESSURE: 172 MMHG

## 2020-06-08 DIAGNOSIS — I87.332 CHRONIC VENOUS HYPERTENSION (IDIOPATHIC) WITH ULCER AND INFLAMMATION OF LEFT LOWER EXTREMITY: ICD-10-CM

## 2020-06-08 DIAGNOSIS — L97.929 VENOUS ULCER OF LEFT LOWER EXTREMITY WITH VARICOSE VEINS: ICD-10-CM

## 2020-06-08 DIAGNOSIS — I83.029 VENOUS ULCER OF LEFT LOWER EXTREMITY WITH VARICOSE VEINS: ICD-10-CM

## 2020-06-08 DIAGNOSIS — L97.322 NON-PRESSURE CHRONIC ULCER OF LEFT ANKLE WITH FAT LAYER EXPOSED: Primary | ICD-10-CM

## 2020-06-08 PROCEDURE — 11042 DBRDMT SUBQ TIS 1ST 20SQCM/<: CPT

## 2020-06-08 PROCEDURE — 99499 UNLISTED E&M SERVICE: CPT | Mod: ,,, | Performed by: SURGERY

## 2020-06-08 PROCEDURE — 27201912 HC WOUND CARE DEBRIDEMENT SUPPLIES

## 2020-06-08 PROCEDURE — 99499 NO LOS: ICD-10-PCS | Mod: ,,, | Performed by: SURGERY

## 2020-06-08 NOTE — ASSESSMENT & PLAN NOTE
Wound continues to improve.  Continue debridement to maintain active phase wound healing.  Continue compression therapy.  Long-term plan will be for stocking utilization.

## 2020-06-08 NOTE — PROGRESS NOTES
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  Patient states the wound has been present for several weeks.  Patient says he has had previous ulcers on the right lower extremity.  Patient denies any previous history of a deep vein thrombosis.  Patient denies any lower extremity swelling. Patient says he has been putting over-the-counter cream on the wound for the past several weeks.  Patient denies any significant drainage.  He denies any significant pain fever or chills.  Patient gives a history of blockages in the lower extremities.  Patient says he had a stent placed in the right lower extremity February of 2019.  Patient does smoke daily.  Sharp debridement performed. Enluxtra and 2 layer compression.  SYLVIA is 0.93 on the left lower extremity. Based on his SYLVIA, arterial circulation is adequate for compression.         Non-pressure chronic ulcer of left ankle with fat layer exposed - Primary    Current Assessment & Plan     Wound continues to improve.  Continue debridement to maintain active phase wound healing.  Continue compression therapy.  Long-term plan will be for stocking utilization.         Chronic venous hypertension (idiopathic) with ulcer and inflammation of left lower extremity    Overview     Patient with long-time history of chronic venous hypertension and prior venous ulcerations.  He has utilized stockings in the past but has not utilized stockings recently.  Patient underwent venous ablation with Dr. Barnes of left leg 12/18/19.               See wound doc progress notes. Documents will be scanned.        Arvin Crowe  [facility]

## 2020-06-11 ENCOUNTER — CLINICAL SUPPORT (OUTPATIENT)
Dept: WOUND CARE | Facility: HOSPITAL | Age: 80
End: 2020-06-11
Attending: SURGERY
Payer: MEDICARE

## 2020-06-11 VITALS
SYSTOLIC BLOOD PRESSURE: 155 MMHG | HEART RATE: 76 BPM | TEMPERATURE: 98 F | DIASTOLIC BLOOD PRESSURE: 88 MMHG | RESPIRATION RATE: 18 BRPM

## 2020-06-11 DIAGNOSIS — I83.029 VENOUS ULCER OF LEFT LOWER EXTREMITY WITH VARICOSE VEINS: Primary | ICD-10-CM

## 2020-06-11 DIAGNOSIS — L97.929 VENOUS ULCER OF LEFT LOWER EXTREMITY WITH VARICOSE VEINS: Primary | ICD-10-CM

## 2020-06-11 PROCEDURE — 29581 APPL MULTLAYER CMPRN SYS LEG: CPT | Mod: LT

## 2020-06-15 ENCOUNTER — OFFICE VISIT (OUTPATIENT)
Dept: WOUND CARE | Facility: HOSPITAL | Age: 80
End: 2020-06-15
Attending: SURGERY
Payer: MEDICARE

## 2020-06-15 VITALS — DIASTOLIC BLOOD PRESSURE: 80 MMHG | SYSTOLIC BLOOD PRESSURE: 150 MMHG | TEMPERATURE: 98 F | HEART RATE: 78 BPM

## 2020-06-15 DIAGNOSIS — I83.029 VENOUS ULCER OF LEFT LOWER EXTREMITY WITH VARICOSE VEINS: ICD-10-CM

## 2020-06-15 DIAGNOSIS — L97.929 VENOUS ULCER OF LEFT LOWER EXTREMITY WITH VARICOSE VEINS: ICD-10-CM

## 2020-06-15 PROCEDURE — 99499 NO LOS: ICD-10-PCS | Mod: ,,, | Performed by: SURGERY

## 2020-06-15 PROCEDURE — 27201912 HC WOUND CARE DEBRIDEMENT SUPPLIES

## 2020-06-15 PROCEDURE — 99499 UNLISTED E&M SERVICE: CPT | Mod: ,,, | Performed by: SURGERY

## 2020-06-15 PROCEDURE — 11042 DBRDMT SUBQ TIS 1ST 20SQCM/<: CPT

## 2020-06-15 NOTE — PROGRESS NOTES
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  Patient states the wound has been present for several weeks.  Patient says he has had previous ulcers on the right lower extremity.  Patient denies any previous history of a deep vein thrombosis.  Patient denies any lower extremity swelling. Patient says he has been putting over-the-counter cream on the wound for the past several weeks.  Patient denies any significant drainage.  He denies any significant pain fever or chills.  Patient gives a history of blockages in the lower extremities.  Patient says he had a stent placed in the right lower extremity February of 2019.  Patient does smoke daily.  Sharp debridement performed. Enluxtra and 2 layer compression.  SYLVIA is 0.93 on the left lower extremity. Based on his SYLVIA, arterial circulation is adequate for compression.         Current Assessment & Plan     Wound continues to improve.  Mid amount of slough present.  Edema well controlled.  Periwound area less macerated.  Will continue sorbact and two layer compression.  Will measure for new compression stockings.               See wound doc progress notes. Documents will be scanned.        Travis Galvan  [facility]

## 2020-06-15 NOTE — ASSESSMENT & PLAN NOTE
Wound continues to improve.  Mid amount of slough present.  Edema well controlled.  Periwound area less macerated.  Will continue sorbact and two layer compression.  Will measure for new compression stockings.

## 2020-06-18 ENCOUNTER — CLINICAL SUPPORT (OUTPATIENT)
Dept: WOUND CARE | Facility: HOSPITAL | Age: 80
End: 2020-06-18
Attending: SURGERY
Payer: MEDICARE

## 2020-06-18 VITALS
TEMPERATURE: 97 F | RESPIRATION RATE: 18 BRPM | HEART RATE: 85 BPM | DIASTOLIC BLOOD PRESSURE: 85 MMHG | SYSTOLIC BLOOD PRESSURE: 165 MMHG

## 2020-06-18 DIAGNOSIS — L97.322 NON-PRESSURE CHRONIC ULCER OF LEFT ANKLE WITH FAT LAYER EXPOSED: ICD-10-CM

## 2020-06-18 DIAGNOSIS — I83.029 VENOUS ULCER OF LEFT LOWER EXTREMITY WITH VARICOSE VEINS: Primary | ICD-10-CM

## 2020-06-18 DIAGNOSIS — L97.929 VENOUS ULCER OF LEFT LOWER EXTREMITY WITH VARICOSE VEINS: Primary | ICD-10-CM

## 2020-06-18 PROCEDURE — 29581 APPL MULTLAYER CMPRN SYS LEG: CPT | Mod: LT

## 2020-06-22 ENCOUNTER — OFFICE VISIT (OUTPATIENT)
Dept: WOUND CARE | Facility: HOSPITAL | Age: 80
End: 2020-06-22
Attending: SURGERY
Payer: MEDICARE

## 2020-06-22 VITALS
SYSTOLIC BLOOD PRESSURE: 152 MMHG | HEART RATE: 60 BPM | DIASTOLIC BLOOD PRESSURE: 94 MMHG | TEMPERATURE: 97 F | RESPIRATION RATE: 18 BRPM

## 2020-06-22 DIAGNOSIS — I83.029 VENOUS ULCER OF LEFT LOWER EXTREMITY WITH VARICOSE VEINS: ICD-10-CM

## 2020-06-22 DIAGNOSIS — L97.929 VENOUS ULCER OF LEFT LOWER EXTREMITY WITH VARICOSE VEINS: ICD-10-CM

## 2020-06-22 PROCEDURE — 99499 UNLISTED E&M SERVICE: CPT | Mod: ,,, | Performed by: SURGERY

## 2020-06-22 PROCEDURE — 99499 NO LOS: ICD-10-PCS | Mod: ,,, | Performed by: SURGERY

## 2020-06-22 PROCEDURE — 27201912 HC WOUND CARE DEBRIDEMENT SUPPLIES

## 2020-06-22 PROCEDURE — 11042 DBRDMT SUBQ TIS 1ST 20SQCM/<: CPT

## 2020-06-22 NOTE — ASSESSMENT & PLAN NOTE
Wound slowly improving.  Wound granulating with minimal slough.  Periwound area less macerated but painful.  Continue compression.  Continue current wound care.  Patient has follow-up with Dr. Barnes next week.

## 2020-06-22 NOTE — PROGRESS NOTES
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  Patient states the wound has been present for several weeks.  Patient says he has had previous ulcers on the right lower extremity.  Patient denies any previous history of a deep vein thrombosis.  Patient denies any lower extremity swelling. Patient says he has been putting over-the-counter cream on the wound for the past several weeks.  Patient denies any significant drainage.  He denies any significant pain fever or chills.  Patient gives a history of blockages in the lower extremities.  Patient says he had a stent placed in the right lower extremity February of 2019.  Patient does smoke daily.  Sharp debridement performed. Enluxtra and 2 layer compression.  SYLVIA is 0.93 on the left lower extremity. Based on his SYLVIA, arterial circulation is adequate for compression.         Current Assessment & Plan     Wound slowly improving.  Wound granulating with minimal slough.  Periwound area less macerated but painful.  Continue compression.  Continue current wound care.  Patient has follow-up with Dr. Barnes next week.               See wound doc progress notes. Documents will be scanned.        Travis Galvan  [facility]

## 2020-06-25 ENCOUNTER — CLINICAL SUPPORT (OUTPATIENT)
Dept: WOUND CARE | Facility: HOSPITAL | Age: 80
End: 2020-06-25
Attending: SURGERY
Payer: MEDICARE

## 2020-06-25 VITALS
RESPIRATION RATE: 20 BRPM | SYSTOLIC BLOOD PRESSURE: 163 MMHG | DIASTOLIC BLOOD PRESSURE: 80 MMHG | TEMPERATURE: 98 F | HEART RATE: 76 BPM

## 2020-06-25 DIAGNOSIS — I87.332 CHRONIC VENOUS HYPERTENSION (IDIOPATHIC) WITH ULCER AND INFLAMMATION OF LEFT LOWER EXTREMITY: ICD-10-CM

## 2020-06-25 PROCEDURE — 99214 OFFICE O/P EST MOD 30 MIN: CPT

## 2020-07-06 ENCOUNTER — OFFICE VISIT (OUTPATIENT)
Dept: WOUND CARE | Facility: HOSPITAL | Age: 80
End: 2020-07-06
Attending: SURGERY
Payer: MEDICARE

## 2020-07-06 VITALS
DIASTOLIC BLOOD PRESSURE: 74 MMHG | TEMPERATURE: 98 F | SYSTOLIC BLOOD PRESSURE: 133 MMHG | HEART RATE: 80 BPM | RESPIRATION RATE: 20 BRPM

## 2020-07-06 DIAGNOSIS — L97.929 VENOUS ULCER OF LEFT LOWER EXTREMITY WITH VARICOSE VEINS: ICD-10-CM

## 2020-07-06 DIAGNOSIS — I83.029 VENOUS ULCER OF LEFT LOWER EXTREMITY WITH VARICOSE VEINS: ICD-10-CM

## 2020-07-06 PROCEDURE — 11042 DBRDMT SUBQ TIS 1ST 20SQCM/<: CPT

## 2020-07-06 PROCEDURE — 99499 UNLISTED E&M SERVICE: CPT | Mod: ,,, | Performed by: SURGERY

## 2020-07-06 PROCEDURE — 99499 NO LOS: ICD-10-PCS | Mod: ,,, | Performed by: SURGERY

## 2020-07-06 PROCEDURE — 27201912 HC WOUND CARE DEBRIDEMENT SUPPLIES

## 2020-07-09 ENCOUNTER — CLINICAL SUPPORT (OUTPATIENT)
Dept: WOUND CARE | Facility: HOSPITAL | Age: 80
End: 2020-07-09
Attending: SURGERY
Payer: MEDICARE

## 2020-07-09 VITALS
SYSTOLIC BLOOD PRESSURE: 147 MMHG | RESPIRATION RATE: 18 BRPM | HEART RATE: 70 BPM | DIASTOLIC BLOOD PRESSURE: 75 MMHG | TEMPERATURE: 98 F

## 2020-07-09 PROCEDURE — 29581 APPL MULTLAYER CMPRN SYS LEG: CPT | Mod: LT

## 2020-07-13 ENCOUNTER — OFFICE VISIT (OUTPATIENT)
Dept: WOUND CARE | Facility: HOSPITAL | Age: 80
End: 2020-07-13
Attending: SURGERY
Payer: MEDICARE

## 2020-07-13 DIAGNOSIS — L97.322 NON-PRESSURE CHRONIC ULCER OF LEFT ANKLE WITH FAT LAYER EXPOSED: Primary | ICD-10-CM

## 2020-07-13 DIAGNOSIS — I87.332 CHRONIC VENOUS HYPERTENSION (IDIOPATHIC) WITH ULCER AND INFLAMMATION OF LEFT LOWER EXTREMITY: ICD-10-CM

## 2020-07-13 PROCEDURE — 11042 DBRDMT SUBQ TIS 1ST 20SQCM/<: CPT

## 2020-07-16 ENCOUNTER — CLINICAL SUPPORT (OUTPATIENT)
Dept: WOUND CARE | Facility: HOSPITAL | Age: 80
End: 2020-07-16
Attending: SURGERY
Payer: MEDICARE

## 2020-07-16 DIAGNOSIS — L97.929 VENOUS ULCER OF LEFT LOWER EXTREMITY WITH VARICOSE VEINS: ICD-10-CM

## 2020-07-16 DIAGNOSIS — I83.029 VENOUS ULCER OF LEFT LOWER EXTREMITY WITH VARICOSE VEINS: ICD-10-CM

## 2020-07-16 PROCEDURE — 29581 APPL MULTLAYER CMPRN SYS LEG: CPT | Mod: LT

## 2020-07-20 ENCOUNTER — OFFICE VISIT (OUTPATIENT)
Dept: WOUND CARE | Facility: HOSPITAL | Age: 80
End: 2020-07-20
Attending: SURGERY
Payer: MEDICARE

## 2020-07-20 VITALS
SYSTOLIC BLOOD PRESSURE: 147 MMHG | DIASTOLIC BLOOD PRESSURE: 82 MMHG | HEART RATE: 65 BPM | RESPIRATION RATE: 18 BRPM | TEMPERATURE: 98 F

## 2020-07-20 DIAGNOSIS — L97.929 VENOUS ULCER OF LEFT LOWER EXTREMITY WITH VARICOSE VEINS: ICD-10-CM

## 2020-07-20 DIAGNOSIS — I83.029 VENOUS ULCER OF LEFT LOWER EXTREMITY WITH VARICOSE VEINS: ICD-10-CM

## 2020-07-20 PROCEDURE — 11042 DBRDMT SUBQ TIS 1ST 20SQCM/<: CPT

## 2020-07-20 PROCEDURE — 99499 UNLISTED E&M SERVICE: CPT | Mod: ,,, | Performed by: SURGERY

## 2020-07-20 PROCEDURE — 99499 NO LOS: ICD-10-PCS | Mod: ,,, | Performed by: SURGERY

## 2020-07-20 PROCEDURE — 27201912 HC WOUND CARE DEBRIDEMENT SUPPLIES

## 2020-07-20 NOTE — PROGRESS NOTES
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  Patient states the wound has been present for several weeks.  Patient says he has had previous ulcers on the right lower extremity.  Patient denies any previous history of a deep vein thrombosis.  Patient denies any lower extremity swelling. Patient says he has been putting over-the-counter cream on the wound for the past several weeks.  Patient denies any significant drainage.  He denies any significant pain fever or chills.  Patient gives a history of blockages in the lower extremities.  Patient says he had a stent placed in the right lower extremity February of 2019.  Patient does smoke daily.  Sharp debridement performed. Enluxtra and 2 layer compression.  SYLVIA is 0.93 on the left lower extremity. Based on his SYLVIA, arterial circulation is adequate for compression.         Current Assessment & Plan     Wound improved today.  Decreased in size.  Decreased maceration in the periwound area.  Continue sorbact and silver alginate               See wound doc progress notes. Documents will be scanned.        Travis Galvan  [facility]

## 2020-07-20 NOTE — ASSESSMENT & PLAN NOTE
Wound improved today.  Decreased in size.  Decreased maceration in the periwound area.  Continue sorbact and silver alginate

## 2020-07-23 ENCOUNTER — CLINICAL SUPPORT (OUTPATIENT)
Dept: WOUND CARE | Facility: HOSPITAL | Age: 80
End: 2020-07-23
Attending: SURGERY
Payer: MEDICARE

## 2020-07-23 VITALS — SYSTOLIC BLOOD PRESSURE: 138 MMHG | DIASTOLIC BLOOD PRESSURE: 84 MMHG | TEMPERATURE: 99 F | HEART RATE: 86 BPM

## 2020-07-23 DIAGNOSIS — L97.929 VENOUS ULCER OF LEFT LOWER EXTREMITY WITH VARICOSE VEINS: ICD-10-CM

## 2020-07-23 DIAGNOSIS — L97.322 NON-PRESSURE CHRONIC ULCER OF LEFT ANKLE WITH FAT LAYER EXPOSED: ICD-10-CM

## 2020-07-23 DIAGNOSIS — I87.332 CHRONIC VENOUS HYPERTENSION (IDIOPATHIC) WITH ULCER AND INFLAMMATION OF LEFT LOWER EXTREMITY: ICD-10-CM

## 2020-07-23 DIAGNOSIS — I83.029 VENOUS ULCER OF LEFT LOWER EXTREMITY WITH VARICOSE VEINS: ICD-10-CM

## 2020-07-23 PROCEDURE — 29581 APPL MULTLAYER CMPRN SYS LEG: CPT | Mod: LT

## 2020-07-27 ENCOUNTER — OFFICE VISIT (OUTPATIENT)
Dept: WOUND CARE | Facility: HOSPITAL | Age: 80
End: 2020-07-27
Attending: SURGERY
Payer: MEDICARE

## 2020-07-27 VITALS
TEMPERATURE: 98 F | SYSTOLIC BLOOD PRESSURE: 159 MMHG | RESPIRATION RATE: 18 BRPM | DIASTOLIC BLOOD PRESSURE: 66 MMHG | HEART RATE: 78 BPM

## 2020-07-27 DIAGNOSIS — L97.929 VENOUS ULCER OF LEFT LOWER EXTREMITY WITH VARICOSE VEINS: Primary | ICD-10-CM

## 2020-07-27 DIAGNOSIS — I83.029 VENOUS ULCER OF LEFT LOWER EXTREMITY WITH VARICOSE VEINS: Primary | ICD-10-CM

## 2020-07-27 DIAGNOSIS — I87.332 CHRONIC VENOUS HYPERTENSION (IDIOPATHIC) WITH ULCER AND INFLAMMATION OF LEFT LOWER EXTREMITY: ICD-10-CM

## 2020-07-27 PROCEDURE — 27201912 HC WOUND CARE DEBRIDEMENT SUPPLIES

## 2020-07-27 PROCEDURE — 99499 NO LOS: ICD-10-PCS | Mod: ,,, | Performed by: SURGERY

## 2020-07-27 PROCEDURE — 11042 DBRDMT SUBQ TIS 1ST 20SQCM/<: CPT

## 2020-07-27 PROCEDURE — 99499 UNLISTED E&M SERVICE: CPT | Mod: ,,, | Performed by: SURGERY

## 2020-07-27 NOTE — PROGRESS NOTES
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  Patient states the wound has been present for several weeks.  Patient says he has had previous ulcers on the right lower extremity.  Patient denies any previous history of a deep vein thrombosis.  Patient denies any lower extremity swelling. Patient says he has been putting over-the-counter cream on the wound for the past several weeks.  Patient denies any significant drainage.  He denies any significant pain fever or chills.  Patient gives a history of blockages in the lower extremities.  Patient says he had a stent placed in the right lower extremity February of 2019.  Patient does smoke daily.  Sharp debridement performed. Enluxtra and 2 layer compression.  SYLVIA is 0.93 on the left lower extremity. Based on his SYLVIA, arterial circulation is adequate for compression.         Chronic venous hypertension (idiopathic) with ulcer and inflammation of left lower extremity    Overview     HPI:  Patient with long-time history of chronic venous hypertension and prior venous ulcerations.  He has utilized stockings in the past but has not utilized stockings recently.  Patient underwent venous ablation with Dr. Barnes of left leg 12/18/19.    Location: left medial ankle    Duration: 9-2-19    Context: venous    Associated Signs & Symptoms: denies pain    Timing: n/a    Severity: n/a    Quality: n/a     Modifying Factors: n/a             Current Assessment & Plan     Wound continues to improve.  Debridement performed today.  Continue with present wound care               See wound doc progress notes. Documents will be scanned.        Arsenio Albert  [facility]

## 2020-07-30 ENCOUNTER — CLINICAL SUPPORT (OUTPATIENT)
Dept: WOUND CARE | Facility: HOSPITAL | Age: 80
End: 2020-07-30
Attending: SURGERY
Payer: MEDICARE

## 2020-07-30 VITALS — SYSTOLIC BLOOD PRESSURE: 144 MMHG | DIASTOLIC BLOOD PRESSURE: 79 MMHG | TEMPERATURE: 99 F | HEART RATE: 71 BPM

## 2020-07-30 DIAGNOSIS — L97.929 VENOUS ULCER OF LEFT LOWER EXTREMITY WITH VARICOSE VEINS: ICD-10-CM

## 2020-07-30 DIAGNOSIS — I87.332 CHRONIC VENOUS HYPERTENSION (IDIOPATHIC) WITH ULCER AND INFLAMMATION OF LEFT LOWER EXTREMITY: ICD-10-CM

## 2020-07-30 DIAGNOSIS — I83.029 VENOUS ULCER OF LEFT LOWER EXTREMITY WITH VARICOSE VEINS: ICD-10-CM

## 2020-07-30 DIAGNOSIS — L97.322 NON-PRESSURE CHRONIC ULCER OF LEFT ANKLE WITH FAT LAYER EXPOSED: ICD-10-CM

## 2020-07-30 PROCEDURE — 29581 APPL MULTLAYER CMPRN SYS LEG: CPT | Mod: LT

## 2020-08-03 ENCOUNTER — OFFICE VISIT (OUTPATIENT)
Dept: WOUND CARE | Facility: HOSPITAL | Age: 80
End: 2020-08-03
Attending: SURGERY
Payer: MEDICARE

## 2020-08-03 VITALS
TEMPERATURE: 99 F | RESPIRATION RATE: 18 BRPM | DIASTOLIC BLOOD PRESSURE: 74 MMHG | HEART RATE: 77 BPM | SYSTOLIC BLOOD PRESSURE: 137 MMHG

## 2020-08-03 DIAGNOSIS — I83.029 VENOUS ULCER OF LEFT LOWER EXTREMITY WITH VARICOSE VEINS: ICD-10-CM

## 2020-08-03 DIAGNOSIS — L97.929 VENOUS ULCER OF LEFT LOWER EXTREMITY WITH VARICOSE VEINS: ICD-10-CM

## 2020-08-03 PROCEDURE — 27201912 HC WOUND CARE DEBRIDEMENT SUPPLIES

## 2020-08-03 PROCEDURE — 99499 UNLISTED E&M SERVICE: CPT | Mod: ,,, | Performed by: SURGERY

## 2020-08-03 PROCEDURE — 11042 DBRDMT SUBQ TIS 1ST 20SQCM/<: CPT

## 2020-08-03 PROCEDURE — 99499 NO LOS: ICD-10-PCS | Mod: ,,, | Performed by: SURGERY

## 2020-08-03 NOTE — PROGRESS NOTES
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  Patient states the wound has been present for several weeks.  Patient says he has had previous ulcers on the right lower extremity.  Patient denies any previous history of a deep vein thrombosis.  Patient denies any lower extremity swelling. Patient says he has been putting over-the-counter cream on the wound for the past several weeks.  Patient denies any significant drainage.  He denies any significant pain fever or chills.  Patient gives a history of blockages in the lower extremities.  Patient says he had a stent placed in the right lower extremity February of 2019.  Patient does smoke daily.  Sharp debridement performed. Enluxtra and 2 layer compression.  SYLVIA is 0.93 on the left lower extremity. Based on his SYLVIA, arterial circulation is adequate for compression.         Current Assessment & Plan     Wound improved.  Wound granulating.  Epithelialization to wound edges.  Periwound area is improved.  There is no longer maceration present.  Continue sorbact.  Continue compression               See wound doc progress notes. Documents will be scanned.        Travis Galvan  [facility]

## 2020-08-03 NOTE — ASSESSMENT & PLAN NOTE
Wound improved.  Wound granulating.  Epithelialization to wound edges.  Periwound area is improved.  There is no longer maceration present.  Continue sorbact.  Continue compression

## 2020-08-06 ENCOUNTER — CLINICAL SUPPORT (OUTPATIENT)
Dept: WOUND CARE | Facility: HOSPITAL | Age: 80
End: 2020-08-06
Attending: SURGERY
Payer: MEDICARE

## 2020-08-06 VITALS
SYSTOLIC BLOOD PRESSURE: 147 MMHG | RESPIRATION RATE: 18 BRPM | HEART RATE: 72 BPM | DIASTOLIC BLOOD PRESSURE: 82 MMHG | TEMPERATURE: 98 F

## 2020-08-06 DIAGNOSIS — I87.332 CHRONIC VENOUS HYPERTENSION (IDIOPATHIC) WITH ULCER AND INFLAMMATION OF LEFT LOWER EXTREMITY: ICD-10-CM

## 2020-08-06 DIAGNOSIS — L97.929 VENOUS ULCER OF LEFT LOWER EXTREMITY WITH VARICOSE VEINS: ICD-10-CM

## 2020-08-06 DIAGNOSIS — L97.322 NON-PRESSURE CHRONIC ULCER OF LEFT ANKLE WITH FAT LAYER EXPOSED: ICD-10-CM

## 2020-08-06 DIAGNOSIS — I83.029 VENOUS ULCER OF LEFT LOWER EXTREMITY WITH VARICOSE VEINS: ICD-10-CM

## 2020-08-06 PROCEDURE — 29581 APPL MULTLAYER CMPRN SYS LEG: CPT | Mod: LT

## 2020-08-10 ENCOUNTER — OFFICE VISIT (OUTPATIENT)
Dept: WOUND CARE | Facility: HOSPITAL | Age: 80
End: 2020-08-10
Attending: SURGERY
Payer: MEDICARE

## 2020-08-10 VITALS
RESPIRATION RATE: 18 BRPM | DIASTOLIC BLOOD PRESSURE: 78 MMHG | HEART RATE: 64 BPM | SYSTOLIC BLOOD PRESSURE: 140 MMHG | TEMPERATURE: 98 F

## 2020-08-10 DIAGNOSIS — L97.929 VENOUS ULCER OF LEFT LOWER EXTREMITY WITH VARICOSE VEINS: ICD-10-CM

## 2020-08-10 DIAGNOSIS — L97.322 NON-PRESSURE CHRONIC ULCER OF LEFT ANKLE WITH FAT LAYER EXPOSED: Primary | ICD-10-CM

## 2020-08-10 DIAGNOSIS — I83.029 VENOUS ULCER OF LEFT LOWER EXTREMITY WITH VARICOSE VEINS: ICD-10-CM

## 2020-08-10 DIAGNOSIS — I87.332 CHRONIC VENOUS HYPERTENSION (IDIOPATHIC) WITH ULCER AND INFLAMMATION OF LEFT LOWER EXTREMITY: ICD-10-CM

## 2020-08-10 PROCEDURE — 99499 UNLISTED E&M SERVICE: CPT | Mod: ,,, | Performed by: SURGERY

## 2020-08-10 PROCEDURE — 99499 NO LOS: ICD-10-PCS | Mod: ,,, | Performed by: SURGERY

## 2020-08-10 PROCEDURE — 11042 DBRDMT SUBQ TIS 1ST 20SQCM/<: CPT

## 2020-08-10 PROCEDURE — 27201912 HC WOUND CARE DEBRIDEMENT SUPPLIES

## 2020-08-10 NOTE — ASSESSMENT & PLAN NOTE
Wound is improving significantly.  Continue debridement to maintain active phase wound healing.  Continue compression.

## 2020-08-10 NOTE — PROGRESS NOTES
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  Patient states the wound has been present for several weeks.  Patient says he has had previous ulcers on the right lower extremity.  Patient denies any previous history of a deep vein thrombosis.  Patient denies any lower extremity swelling. Patient says he has been putting over-the-counter cream on the wound for the past several weeks.  Patient denies any significant drainage.  He denies any significant pain fever or chills.  Patient gives a history of blockages in the lower extremities.  Patient says he had a stent placed in the right lower extremity February of 2019.  Patient does smoke daily.  Sharp debridement performed. Enluxtra and 2 layer compression.  SYLVIA is 0.93 on the left lower extremity. Based on his SYLVIA, arterial circulation is adequate for compression.         Non-pressure chronic ulcer of left ankle with fat layer exposed - Primary    Current Assessment & Plan     Wound is improving significantly.  Continue debridement to maintain active phase wound healing.  Continue compression.         Chronic venous hypertension (idiopathic) with ulcer and inflammation of left lower extremity    Overview     HPI:  Patient with long-time history of chronic venous hypertension and prior venous ulcerations.  He has utilized stockings in the past but has not utilized stockings recently.  Patient underwent venous ablation with Dr. Barnes of left leg 12/18/19.    Location: left medial ankle    Duration: 9-2-19    Context: venous    Associated Signs & Symptoms: denies pain    Timing: n/a    Severity: n/a    Quality: n/a     Modifying Factors: n/a                   See wound doc progress notes. Documents will be  scanned.        Arvin CHU Crowe  [facility]

## 2020-08-13 ENCOUNTER — CLINICAL SUPPORT (OUTPATIENT)
Dept: WOUND CARE | Facility: HOSPITAL | Age: 80
End: 2020-08-13
Attending: SURGERY
Payer: MEDICARE

## 2020-08-13 VITALS
HEART RATE: 62 BPM | TEMPERATURE: 98 F | RESPIRATION RATE: 16 BRPM | DIASTOLIC BLOOD PRESSURE: 82 MMHG | SYSTOLIC BLOOD PRESSURE: 147 MMHG

## 2020-08-13 DIAGNOSIS — L97.929 VENOUS ULCER OF LEFT LOWER EXTREMITY WITH VARICOSE VEINS: ICD-10-CM

## 2020-08-13 DIAGNOSIS — I83.029 VENOUS ULCER OF LEFT LOWER EXTREMITY WITH VARICOSE VEINS: ICD-10-CM

## 2020-08-13 PROCEDURE — 29581 APPL MULTLAYER CMPRN SYS LEG: CPT | Mod: LT

## 2020-08-14 NOTE — PROGRESS NOTES
[facility]  Wound Care  Progress Note    Problem List Items Addressed This Visit        Derm    Non-pressure chronic ulcer of left ankle with fat layer exposed - Primary    Chronic venous hypertension (idiopathic) with ulcer and inflammation of left lower extremity    Overview     HPI:  Patient with long-time history of chronic venous hypertension and prior venous ulcerations.  He has utilized stockings in the past but has not utilized stockings recently.  Patient underwent venous ablation with Dr. Barnes of left leg 12/18/19.    Location: left medial ankle    Duration: 9-2-19    Context: venous    Associated Signs & Symptoms: denies pain    Timing: n/a    Severity: n/a    Quality: n/a     Modifying Factors: n/a               Physical Exam   Constitutional: He is oriented to person, place, and time. He appears well-developed and well-nourished.   HENT:   Head: Normocephalic.   Pulmonary/Chest: Effort normal.   Musculoskeletal: Normal range of motion.   Neurological: He is alert and oriented to person, place, and time.   Skin: Skin is warm and dry.   Venous ulcer to left medial ankle with adipose exposed, not due to DVT   Psychiatric: He has a normal mood and affect. His behavior is normal. Judgment and thought content normal.     Ulcer debrided, macerated miesha wound painted with gentian violet, will use sorbact and cover with drawtex, 2 layer compression wrap, will come back on Thursday to have it changed. Elevate leg as often as possible and keep it clean and dry.    See wound doc progress notes. Documents will be scanned.        Briana Aguilar  [facility]     [facility] clearance request, letter in chart.

## 2020-08-17 ENCOUNTER — OFFICE VISIT (OUTPATIENT)
Dept: WOUND CARE | Facility: HOSPITAL | Age: 80
End: 2020-08-17
Attending: SURGERY
Payer: MEDICARE

## 2020-08-17 VITALS — TEMPERATURE: 98 F | SYSTOLIC BLOOD PRESSURE: 137 MMHG | DIASTOLIC BLOOD PRESSURE: 84 MMHG | HEART RATE: 65 BPM

## 2020-08-17 DIAGNOSIS — I87.332 CHRONIC VENOUS HYPERTENSION (IDIOPATHIC) WITH ULCER AND INFLAMMATION OF LEFT LOWER EXTREMITY: ICD-10-CM

## 2020-08-17 PROCEDURE — 27201912 HC WOUND CARE DEBRIDEMENT SUPPLIES

## 2020-08-17 PROCEDURE — 99499 UNLISTED E&M SERVICE: CPT | Mod: ,,, | Performed by: SURGERY

## 2020-08-17 PROCEDURE — 11042 DBRDMT SUBQ TIS 1ST 20SQCM/<: CPT

## 2020-08-17 PROCEDURE — 99499 NO LOS: ICD-10-PCS | Mod: ,,, | Performed by: SURGERY

## 2020-08-17 NOTE — ASSESSMENT & PLAN NOTE
Wound improved.  Decreased in size.  Wound remains clean with minimal slough present.  Periwound area at has improved.  Significant decrease in a mild maceration present.  Continue current management

## 2020-08-17 NOTE — PROGRESS NOTES
[facility]  Wound Care  Progress Note    Problem List Items Addressed This Visit     Chronic venous hypertension (idiopathic) with ulcer and inflammation of left lower extremity    Overview     HPI:  Patient with long-time history of chronic venous hypertension and prior venous ulcerations.  He has utilized stockings in the past but has not utilized stockings recently.  Patient underwent venous ablation with Dr. Barnes of left leg 12/18/19.    Location: left medial ankle    Duration: 9-2-19    Context: venous    Associated Signs & Symptoms: denies pain    Timing: n/a    Severity: n/a    Quality: n/a     Modifying Factors: n/a             Current Assessment & Plan     Wound improved.  Decreased in size.  Wound remains clean with minimal slough present.  Periwound area at has improved.  Significant decrease in a mild maceration present.  Continue current management               See wound doc progress notes. Documents will be scanned.        Travis Galvan  [facility]

## 2020-08-20 ENCOUNTER — CLINICAL SUPPORT (OUTPATIENT)
Dept: WOUND CARE | Facility: HOSPITAL | Age: 80
End: 2020-08-20
Attending: SURGERY
Payer: MEDICARE

## 2020-08-20 VITALS
TEMPERATURE: 98 F | HEART RATE: 81 BPM | DIASTOLIC BLOOD PRESSURE: 62 MMHG | SYSTOLIC BLOOD PRESSURE: 133 MMHG | RESPIRATION RATE: 18 BRPM

## 2020-08-20 DIAGNOSIS — L97.929 VENOUS ULCER OF LEFT LOWER EXTREMITY WITH VARICOSE VEINS: ICD-10-CM

## 2020-08-20 DIAGNOSIS — I83.029 VENOUS ULCER OF LEFT LOWER EXTREMITY WITH VARICOSE VEINS: ICD-10-CM

## 2020-08-20 PROCEDURE — 29581 APPL MULTLAYER CMPRN SYS LEG: CPT | Mod: LT

## 2020-08-20 NOTE — ASSESSMENT & PLAN NOTE
Patient underwent evaluation as [facility] last week.  He states he had venous and arterial ultrasound performed.  Patient states he was started on Eliquis.  Unclear of the results of his vascular studies.  Will obtain studies from [facility].  Wound today is more macerated.  There is no sign of infection.  Edema is fairly well controlled.  Will implement silver alginate due to the increased drainage.  Will continue compression wrap.  Will review reports from [facility].   Slightly decreased PED, improved.

## 2020-08-24 ENCOUNTER — OFFICE VISIT (OUTPATIENT)
Dept: WOUND CARE | Facility: HOSPITAL | Age: 80
End: 2020-08-24
Attending: SURGERY
Payer: MEDICARE

## 2020-08-24 VITALS — SYSTOLIC BLOOD PRESSURE: 124 MMHG | HEART RATE: 76 BPM | DIASTOLIC BLOOD PRESSURE: 65 MMHG | TEMPERATURE: 98 F

## 2020-08-24 DIAGNOSIS — I87.332 CHRONIC VENOUS HYPERTENSION (IDIOPATHIC) WITH ULCER AND INFLAMMATION OF LEFT LOWER EXTREMITY: ICD-10-CM

## 2020-08-24 DIAGNOSIS — I83.029 VENOUS ULCER OF LEFT LOWER EXTREMITY WITH VARICOSE VEINS: Primary | ICD-10-CM

## 2020-08-24 DIAGNOSIS — L97.929 VENOUS ULCER OF LEFT LOWER EXTREMITY WITH VARICOSE VEINS: Primary | ICD-10-CM

## 2020-08-24 DIAGNOSIS — L97.322 NON-PRESSURE CHRONIC ULCER OF LEFT ANKLE WITH FAT LAYER EXPOSED: ICD-10-CM

## 2020-08-24 PROCEDURE — 11042 DBRDMT SUBQ TIS 1ST 20SQCM/<: CPT

## 2020-08-24 PROCEDURE — 27201912 HC WOUND CARE DEBRIDEMENT SUPPLIES

## 2020-08-24 PROCEDURE — 99499 UNLISTED E&M SERVICE: CPT | Mod: ,,, | Performed by: SURGERY

## 2020-08-24 PROCEDURE — 99499 NO LOS: ICD-10-PCS | Mod: ,,, | Performed by: SURGERY

## 2020-08-24 NOTE — PROGRESS NOTES
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  Patient states the wound has been present for several weeks.  Patient says he has had previous ulcers on the right lower extremity.  Patient denies any previous history of a deep vein thrombosis.  Patient denies any lower extremity swelling. Patient says he has been putting over-the-counter cream on the wound for the past several weeks.  Patient denies any significant drainage.  He denies any significant pain fever or chills.  Patient gives a history of blockages in the lower extremities.  Patient says he had a stent placed in the right lower extremity February of 2019.  Patient does smoke daily.  Sharp debridement performed. Enluxtra and 2 layer compression.  SYLVIA is 0.93 on the left lower extremity. Based on his SYLVIA, arterial circulation is adequate for compression.         Non-pressure chronic ulcer of left ankle with fat layer exposed    Current Assessment & Plan     Wound continues to improve.  Continue debridement to maintain active phase wound healing.  Continue compression therapy.  Long-term high-intensity compression stockings.         Chronic venous hypertension (idiopathic) with ulcer and inflammation of left lower extremity    Overview     HPI:  Patient with long-time history of chronic venous hypertension and prior venous ulcerations.  He has utilized stockings in the past but has not utilized stockings recently.  Patient underwent venous ablation with Dr. Barnes of left leg 12/18/19.    Location: left medial ankle    Duration: 9-2-19    Context: venous    Associated Signs & Symptoms: denies pain    Timing: n/a    Severity: n/a    Quality: n/a     Modifying Factors: n/a                    See wound doc progress notes. Documents will be scanned.        Arvin CHU Crowe  [facility]

## 2020-08-31 ENCOUNTER — OFFICE VISIT (OUTPATIENT)
Dept: WOUND CARE | Facility: HOSPITAL | Age: 80
End: 2020-08-31
Attending: SURGERY
Payer: MEDICARE

## 2020-08-31 VITALS
RESPIRATION RATE: 16 BRPM | SYSTOLIC BLOOD PRESSURE: 122 MMHG | HEART RATE: 92 BPM | TEMPERATURE: 98 F | DIASTOLIC BLOOD PRESSURE: 71 MMHG

## 2020-08-31 DIAGNOSIS — I83.029 VENOUS ULCER OF LEFT LOWER EXTREMITY WITH VARICOSE VEINS: ICD-10-CM

## 2020-08-31 DIAGNOSIS — L97.929 VENOUS ULCER OF LEFT LOWER EXTREMITY WITH VARICOSE VEINS: ICD-10-CM

## 2020-08-31 PROCEDURE — 99499 NO LOS: ICD-10-PCS | Mod: ,,, | Performed by: SURGERY

## 2020-08-31 PROCEDURE — 27201912 HC WOUND CARE DEBRIDEMENT SUPPLIES

## 2020-08-31 PROCEDURE — 11042 DBRDMT SUBQ TIS 1ST 20SQCM/<: CPT

## 2020-08-31 PROCEDURE — 99499 UNLISTED E&M SERVICE: CPT | Mod: ,,, | Performed by: SURGERY

## 2020-08-31 NOTE — PROGRESS NOTES
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  Patient states the wound has been present for several weeks.  Patient says he has had previous ulcers on the right lower extremity.  Patient denies any previous history of a deep vein thrombosis.  Patient denies any lower extremity swelling. Patient says he has been putting over-the-counter cream on the wound for the past several weeks.  Patient denies any significant drainage.  He denies any significant pain fever or chills.  Patient gives a history of blockages in the lower extremities.  Patient says he had a stent placed in the right lower extremity February of 2019.  Patient does smoke daily.  Sharp debridement performed. Enluxtra and 2 layer compression.  SYLVIA is 0.93 on the left lower extremity. Based on his SYLVIA, arterial circulation is adequate for compression.         Current Assessment & Plan     Wound continues to improve.  Edema well controlled.  Maceration resolved.  Continue sorebact and compression               See wound doc progress notes. Documents will be scanned.        Travis Galvan  [facility]

## 2020-08-31 NOTE — ASSESSMENT & PLAN NOTE
Wound continues to improve.  Edema well controlled.  Maceration resolved.  Continue sorebact and compression

## 2020-09-03 ENCOUNTER — CLINICAL SUPPORT (OUTPATIENT)
Dept: WOUND CARE | Facility: HOSPITAL | Age: 80
End: 2020-09-03
Attending: SURGERY
Payer: MEDICARE

## 2020-09-03 VITALS — DIASTOLIC BLOOD PRESSURE: 67 MMHG | SYSTOLIC BLOOD PRESSURE: 114 MMHG | TEMPERATURE: 98 F | HEART RATE: 85 BPM

## 2020-09-03 DIAGNOSIS — I83.029 VENOUS ULCER OF LEFT LOWER EXTREMITY WITH VARICOSE VEINS: ICD-10-CM

## 2020-09-03 DIAGNOSIS — L97.929 VENOUS ULCER OF LEFT LOWER EXTREMITY WITH VARICOSE VEINS: ICD-10-CM

## 2020-09-03 DIAGNOSIS — L97.322 NON-PRESSURE CHRONIC ULCER OF LEFT ANKLE WITH FAT LAYER EXPOSED: ICD-10-CM

## 2020-09-03 DIAGNOSIS — I87.332 CHRONIC VENOUS HYPERTENSION (IDIOPATHIC) WITH ULCER AND INFLAMMATION OF LEFT LOWER EXTREMITY: ICD-10-CM

## 2020-09-03 PROCEDURE — 29581 APPL MULTLAYER CMPRN SYS LEG: CPT | Mod: LT

## 2020-09-10 ENCOUNTER — OFFICE VISIT (OUTPATIENT)
Dept: WOUND CARE | Facility: HOSPITAL | Age: 80
End: 2020-09-10
Attending: SURGERY
Payer: MEDICARE

## 2020-09-10 VITALS
RESPIRATION RATE: 20 BRPM | DIASTOLIC BLOOD PRESSURE: 70 MMHG | SYSTOLIC BLOOD PRESSURE: 126 MMHG | TEMPERATURE: 98 F | HEART RATE: 84 BPM

## 2020-09-10 DIAGNOSIS — I83.029 VENOUS ULCER OF LEFT LOWER EXTREMITY WITH VARICOSE VEINS: Primary | ICD-10-CM

## 2020-09-10 DIAGNOSIS — I87.332 CHRONIC VENOUS HYPERTENSION (IDIOPATHIC) WITH ULCER AND INFLAMMATION OF LEFT LOWER EXTREMITY: ICD-10-CM

## 2020-09-10 DIAGNOSIS — L97.929 VENOUS ULCER OF LEFT LOWER EXTREMITY WITH VARICOSE VEINS: Primary | ICD-10-CM

## 2020-09-10 PROCEDURE — 99499 NO LOS: ICD-10-PCS | Mod: ,,, | Performed by: SURGERY

## 2020-09-10 PROCEDURE — 99499 UNLISTED E&M SERVICE: CPT | Mod: ,,, | Performed by: SURGERY

## 2020-09-10 PROCEDURE — 11042 DBRDMT SUBQ TIS 1ST 20SQCM/<: CPT

## 2020-09-10 PROCEDURE — 27201912 HC WOUND CARE DEBRIDEMENT SUPPLIES

## 2020-09-10 NOTE — PROGRESS NOTES
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  Patient states the wound has been present for several weeks.  Patient says he has had previous ulcers on the right lower extremity.  Patient denies any previous history of a deep vein thrombosis.  Patient denies any lower extremity swelling. Patient says he has been putting over-the-counter cream on the wound for the past several weeks.  Patient denies any significant drainage.  He denies any significant pain fever or chills.  Patient gives a history of blockages in the lower extremities.  Patient says he had a stent placed in the right lower extremity February of 2019.  Patient does smoke daily.  Sharp debridement performed. Enluxtra and 2 layer compression.  SYLVIA is 0.93 on the left lower extremity. Based on his SYLVIA, arterial circulation is adequate for compression.         Chronic venous hypertension (idiopathic) with ulcer and inflammation of left lower extremity    Overview     HPI:  Patient with long-time history of chronic venous hypertension and prior venous ulcerations.  He has utilized stockings in the past but has not utilized stockings recently.  Patient underwent venous ablation with Dr. Barnes of left leg 12/18/19.    Location: left medial ankle    Duration: 9-2-19    Context: venous    Associated Signs & Symptoms: denies pain    Timing: n/a    Severity: n/a    Quality: n/a     Modifying Factors: n/a             Current Assessment & Plan     Wound is much smaller. Continue present care.                See wound doc progress notes. Documents will be scanned.        Arsenio Albert  [facility]

## 2020-09-14 ENCOUNTER — OFFICE VISIT (OUTPATIENT)
Dept: WOUND CARE | Facility: HOSPITAL | Age: 80
End: 2020-09-14
Attending: SURGERY
Payer: MEDICARE

## 2020-09-14 VITALS
HEART RATE: 92 BPM | DIASTOLIC BLOOD PRESSURE: 77 MMHG | RESPIRATION RATE: 20 BRPM | TEMPERATURE: 98 F | SYSTOLIC BLOOD PRESSURE: 140 MMHG

## 2020-09-14 DIAGNOSIS — I87.332 CHRONIC VENOUS HYPERTENSION (IDIOPATHIC) WITH ULCER AND INFLAMMATION OF LEFT LOWER EXTREMITY: ICD-10-CM

## 2020-09-14 PROCEDURE — 27201912 HC WOUND CARE DEBRIDEMENT SUPPLIES

## 2020-09-14 PROCEDURE — 99499 UNLISTED E&M SERVICE: CPT | Mod: ,,, | Performed by: SURGERY

## 2020-09-14 PROCEDURE — 99499 NO LOS: ICD-10-PCS | Mod: ,,, | Performed by: SURGERY

## 2020-09-14 PROCEDURE — 11042 DBRDMT SUBQ TIS 1ST 20SQCM/<: CPT

## 2020-09-14 NOTE — PROGRESS NOTES
[facility]  Wound Care  Progress Note    Problem List Items Addressed This Visit     Chronic venous hypertension (idiopathic) with ulcer and inflammation of left lower extremity    Overview     HPI:  Patient with long-time history of chronic venous hypertension and prior venous ulcerations.  He has utilized stockings in the past but has not utilized stockings recently.  Patient underwent venous ablation with Dr. Barnes of left leg 12/18/19.    Location: left medial ankle    Duration: 9-2-19    Context: venous    Associated Signs & Symptoms: denies pain    Timing: n/a    Severity: n/a    Quality: n/a     Modifying Factors: n/a             Current Assessment & Plan     Patient presents in the area is covered with a scab.  The scab was removed to reveal a small underlying wound.  The wound is clean and granulating.  It is nearly resolved.  Continue current management               See wound doc progress notes. Documents will be scanned.        Travis Galvan  [facility]

## 2020-09-14 NOTE — ASSESSMENT & PLAN NOTE
Patient presents in the area is covered with a scab.  The scab was removed to reveal a small underlying wound.  The wound is clean and granulating.  It is nearly resolved.  Continue current management

## 2020-09-21 ENCOUNTER — OFFICE VISIT (OUTPATIENT)
Dept: WOUND CARE | Facility: HOSPITAL | Age: 80
End: 2020-09-21
Attending: SURGERY
Payer: MEDICARE

## 2020-09-21 VITALS
SYSTOLIC BLOOD PRESSURE: 138 MMHG | DIASTOLIC BLOOD PRESSURE: 70 MMHG | HEART RATE: 78 BPM | TEMPERATURE: 98 F | RESPIRATION RATE: 18 BRPM

## 2020-09-21 DIAGNOSIS — I87.332 CHRONIC VENOUS HYPERTENSION (IDIOPATHIC) WITH ULCER AND INFLAMMATION OF LEFT LOWER EXTREMITY: Primary | ICD-10-CM

## 2020-09-21 DIAGNOSIS — I83.029 VENOUS ULCER OF LEFT LOWER EXTREMITY WITH VARICOSE VEINS: ICD-10-CM

## 2020-09-21 DIAGNOSIS — L97.929 VENOUS ULCER OF LEFT LOWER EXTREMITY WITH VARICOSE VEINS: ICD-10-CM

## 2020-09-21 DIAGNOSIS — L97.322 NON-PRESSURE CHRONIC ULCER OF LEFT ANKLE WITH FAT LAYER EXPOSED: ICD-10-CM

## 2020-09-21 PROCEDURE — 29581 APPL MULTLAYER CMPRN SYS LEG: CPT | Mod: LT

## 2020-09-21 PROCEDURE — 99499 UNLISTED E&M SERVICE: CPT | Mod: ,,, | Performed by: SURGERY

## 2020-09-21 PROCEDURE — 99499 NO LOS: ICD-10-PCS | Mod: ,,, | Performed by: SURGERY

## 2020-09-21 NOTE — PROGRESS NOTES
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  Patient states the wound has been present for several weeks.  Patient says he has had previous ulcers on the right lower extremity.  Patient denies any previous history of a deep vein thrombosis.  Patient denies any lower extremity swelling. Patient says he has been putting over-the-counter cream on the wound for the past several weeks.  Patient denies any significant drainage.  He denies any significant pain fever or chills.  Patient gives a history of blockages in the lower extremities.  Patient says he had a stent placed in the right lower extremity February of 2019.  Patient does smoke daily.  Sharp debridement performed. Enluxtra and 2 layer compression.  SYLVIA is 0.93 on the left lower extremity. Based on his SYLVIA, arterial circulation is adequate for compression.         Current Assessment & Plan     Wound noted to be healed.  Edema controlled with compression to layer.  Prescription for 30-40 mm mercury compression stockings given.  Maintain 2 layer compression until stockings obtain.         Non-pressure chronic ulcer of left ankle with fat layer exposed    Current Assessment & Plan     Wound healed         Chronic venous hypertension (idiopathic) with ulcer and inflammation of left lower extremity - Primary    Overview     HPI:  Patient with long-time history of chronic venous hypertension and prior venous ulcerations.  He has utilized stockings in the past but has not utilized stockings recently.  Patient underwent venous ablation with Dr. Barnes of left leg 12/18/19.    Location: left medial ankle    Duration: 9-2-19    Context: venous    Associated Signs & Symptoms: denies pain    Timing:  n/a    Severity: n/a    Quality: n/a     Modifying Factors: n/a                   See wound doc progress notes. Documents will be scanned.        Arvin CHU Crowe  [facility]

## 2020-09-21 NOTE — ASSESSMENT & PLAN NOTE
Wound noted to be healed.  Edema controlled with compression to layer.  Prescription for 30-40 mm mercury compression stockings given.  Maintain 2 layer compression until stockings obtain.

## 2020-09-28 ENCOUNTER — OFFICE VISIT (OUTPATIENT)
Dept: WOUND CARE | Facility: HOSPITAL | Age: 80
End: 2020-09-28
Attending: SURGERY
Payer: MEDICARE

## 2020-09-28 VITALS — HEART RATE: 78 BPM | TEMPERATURE: 98 F | SYSTOLIC BLOOD PRESSURE: 132 MMHG | DIASTOLIC BLOOD PRESSURE: 74 MMHG

## 2020-09-28 DIAGNOSIS — L97.929 VENOUS ULCER OF LEFT LOWER EXTREMITY WITH VARICOSE VEINS: ICD-10-CM

## 2020-09-28 DIAGNOSIS — I83.029 VENOUS ULCER OF LEFT LOWER EXTREMITY WITH VARICOSE VEINS: ICD-10-CM

## 2020-09-28 PROCEDURE — 99212 OFFICE O/P EST SF 10 MIN: CPT

## 2020-09-28 PROCEDURE — 99499 NO LOS: ICD-10-PCS | Mod: ,,, | Performed by: SURGERY

## 2020-09-28 PROCEDURE — 99499 UNLISTED E&M SERVICE: CPT | Mod: ,,, | Performed by: SURGERY

## 2020-09-28 NOTE — ASSESSMENT & PLAN NOTE
Wound has resolved.  Patient will be discharged from the Wound Center.  Patient encouraged to continue with stocking for compression and edema control

## 2020-09-28 NOTE — PROGRESS NOTES
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  Patient states the wound has been present for several weeks.  Patient says he has had previous ulcers on the right lower extremity.  Patient denies any previous history of a deep vein thrombosis.  Patient denies any lower extremity swelling. Patient says he has been putting over-the-counter cream on the wound for the past several weeks.  Patient denies any significant drainage.  He denies any significant pain fever or chills.  Patient gives a history of blockages in the lower extremities.  Patient says he had a stent placed in the right lower extremity February of 2019.  Patient does smoke daily.  Sharp debridement performed. Enluxtra and 2 layer compression.  SYLVIA is 0.93 on the left lower extremity. Based on his SYLVIA, arterial circulation is adequate for compression.         Current Assessment & Plan     Wound has resolved.  Patient will be discharged from the Wound Center.  Patient encouraged to continue with stocking for compression and edema control               See wound doc progress notes. Documents will be scanned.        Travis Galvan  [facility]

## 2021-06-14 NOTE — PATIENT INSTRUCTIONS
Please see wound care instructions which have been provided separately.      Returned call to patient. Two patient indentifiers verified. Pt was informed of message. Pt stated he had lab work checked on Friday and labs were okay per patient. Pt asked what will happen if he has a nose bleed again. Pt was informed to call our office then to get an appointment with ENT to find out why he is bleeding. Pt verbalized understanding and denies any further questions at this time.      Future Appointments   Date Time Provider Zoe Klarissa   10/6/2021  1:20 PM MD MARY Partida [facility]B

## 2022-07-25 NOTE — ASSESSMENT & PLAN NOTE
Patient with persistent increased drainage.  There is periwound maceration.  Will stop Mepilex Ag.  Will also restart dressing changes twice weekly.   abdomen

## 2023-07-30 NOTE — ASSESSMENT & PLAN NOTE
Swelling and periwound maceration better today since free implementing to wear compression.  Also remains fairly clean.  Continue Mepilex Ag gentian violet for miesha wound area and 2 layer compression.   Alert and oriented to person, place and time

## 2023-09-01 ENCOUNTER — HOSPITAL ENCOUNTER (OUTPATIENT)
Dept: RADIOLOGY | Facility: HOSPITAL | Age: 83
Discharge: HOME | End: 2023-09-01
Attending: NURSE PRACTITIONER
Payer: MEDICARE

## 2023-09-01 ENCOUNTER — OFFICE VISIT (OUTPATIENT)
Dept: WOUND CARE | Facility: HOSPITAL | Age: 83
End: 2023-09-01
Attending: NURSE PRACTITIONER
Payer: MEDICARE

## 2023-09-01 VITALS
SYSTOLIC BLOOD PRESSURE: 135 MMHG | DIASTOLIC BLOOD PRESSURE: 62 MMHG | RESPIRATION RATE: 17 BRPM | HEART RATE: 76 BPM | TEMPERATURE: 97 F

## 2023-09-01 DIAGNOSIS — E11.621 DIABETIC ULCER OF TOE OF LEFT FOOT ASSOCIATED WITH TYPE 2 DIABETES MELLITUS, WITH FAT LAYER EXPOSED: ICD-10-CM

## 2023-09-01 DIAGNOSIS — L97.522 DIABETIC ULCER OF TOE OF LEFT FOOT ASSOCIATED WITH TYPE 2 DIABETES MELLITUS, WITH FAT LAYER EXPOSED: ICD-10-CM

## 2023-09-01 DIAGNOSIS — L97.522 ULCER OF LEFT FOOT, WITH FAT LAYER EXPOSED: ICD-10-CM

## 2023-09-01 PROCEDURE — 73630 X-RAY EXAM OF FOOT: CPT | Mod: TC,LT

## 2023-09-01 PROCEDURE — 11042 DBRDMT SUBQ TIS 1ST 20SQCM/<: CPT

## 2023-09-01 PROCEDURE — 99214 OFFICE O/P EST MOD 30 MIN: CPT | Mod: 25

## 2023-09-08 ENCOUNTER — OFFICE VISIT (OUTPATIENT)
Dept: WOUND CARE | Facility: HOSPITAL | Age: 83
End: 2023-09-08
Attending: NURSE PRACTITIONER
Payer: MEDICARE

## 2023-09-08 VITALS
DIASTOLIC BLOOD PRESSURE: 60 MMHG | RESPIRATION RATE: 18 BRPM | HEART RATE: 76 BPM | SYSTOLIC BLOOD PRESSURE: 127 MMHG | TEMPERATURE: 98 F

## 2023-09-08 DIAGNOSIS — L97.522 ULCER OF LEFT FOOT, WITH FAT LAYER EXPOSED: ICD-10-CM

## 2023-09-08 DIAGNOSIS — E11.621 DIABETIC ULCER OF TOE OF LEFT FOOT ASSOCIATED WITH TYPE 2 DIABETES MELLITUS, WITH FAT LAYER EXPOSED: Primary | ICD-10-CM

## 2023-09-08 DIAGNOSIS — L97.522 DIABETIC ULCER OF TOE OF LEFT FOOT ASSOCIATED WITH TYPE 2 DIABETES MELLITUS, WITH FAT LAYER EXPOSED: Primary | ICD-10-CM

## 2023-09-08 PROCEDURE — 11042 DBRDMT SUBQ TIS 1ST 20SQCM/<: CPT

## 2023-09-08 NOTE — PROGRESS NOTES
[facility]  Wound Care  Progress Note    Problem List Items Addressed This Visit          Endocrine    Diabetic ulcer of toe of left foot associated with type 2 diabetes mellitus, with fat layer exposed - Primary    Overview     HPI: 83 yr old male with history of DM, venous insufficiency, noticed a callus to his toe and applied corn remover that made a wound on his toe, he went to his PCP and was placed on abx and referred to wound care.    Location: left distal great toe    Duration: 8-1-23    Context: DFU    Associated Signs & Symptoms: denies pain    Timing: n/a    Severity: n/a    Quality: n/a    Modifying Factors: n/a    9-8-23: here for wound care for DFU to left great toe.         Relevant Orders    CBC W/ AUTO DIFFERENTIAL (Completed)    Sedimentation rate    C-REACTIVE PROTEIN    COMPREHENSIVE METABOLIC PANEL       Orthopedic    Ulcer of left foot, with fat layer exposed     Physical Exam  Vitals reviewed.   Constitutional:       Appearance: Normal appearance.   HENT:      Head: Normocephalic.   Pulmonary:      Effort: Pulmonary effort is normal.   Musculoskeletal:         General: Normal range of motion.   Skin:     General: Skin is warm and dry.      Comments: DFU, holguin 2 to left great toe with adipose exposed. Blood sugar is well controlled.   Neurological:      Mental Status: He is alert and oriented to person, place, and time.   Psychiatric:         Mood and Affect: Mood normal.         Behavior: Behavior normal.         Thought Content: Thought content normal.         Judgment: Judgment normal.     Ulcer debrided, xray noted and WNL, will send for labs to assess inflammatory markers, culture obtained, will order santyl daily and cover with xeroform guaze daily. Keep clean and dry, pt to offload pressure, offered darco shoe but pt states he will get sandals. Will recheck in one week, if no improvement noted may need a biopsy d/t appearance of wound.  See wound doc progress notes.  Documents will be scanned.        Briana HowardBoeuf  [facility]

## 2023-09-15 ENCOUNTER — OFFICE VISIT (OUTPATIENT)
Dept: WOUND CARE | Facility: HOSPITAL | Age: 83
End: 2023-09-15
Attending: NURSE PRACTITIONER
Payer: MEDICARE

## 2023-09-15 VITALS
TEMPERATURE: 98 F | SYSTOLIC BLOOD PRESSURE: 124 MMHG | RESPIRATION RATE: 17 BRPM | HEART RATE: 79 BPM | DIASTOLIC BLOOD PRESSURE: 64 MMHG

## 2023-09-15 DIAGNOSIS — L97.522 DIABETIC ULCER OF TOE OF LEFT FOOT ASSOCIATED WITH TYPE 2 DIABETES MELLITUS, WITH FAT LAYER EXPOSED: Primary | ICD-10-CM

## 2023-09-15 DIAGNOSIS — E11.621 DIABETIC ULCER OF TOE OF LEFT FOOT ASSOCIATED WITH TYPE 2 DIABETES MELLITUS, WITH FAT LAYER EXPOSED: Primary | ICD-10-CM

## 2023-09-15 DIAGNOSIS — L97.522 ULCER OF LEFT FOOT, WITH FAT LAYER EXPOSED: ICD-10-CM

## 2023-09-15 PROCEDURE — 88304 TISSUE EXAM BY PATHOLOGIST: CPT | Performed by: PATHOLOGY

## 2023-09-15 PROCEDURE — 11106 INCAL BX SKN SINGLE LES: CPT

## 2023-09-15 PROCEDURE — 88312 PR  SPECIAL STAINS,GROUP I: ICD-10-PCS | Mod: 26,,, | Performed by: PATHOLOGY

## 2023-09-15 PROCEDURE — 88312 SPECIAL STAINS GROUP 1: CPT | Performed by: PATHOLOGY

## 2023-09-15 PROCEDURE — 88312 SPECIAL STAINS GROUP 1: CPT | Mod: 26,,, | Performed by: PATHOLOGY

## 2023-09-15 PROCEDURE — 88304 TISSUE EXAM BY PATHOLOGIST: CPT | Mod: 26,,, | Performed by: PATHOLOGY

## 2023-09-15 PROCEDURE — 88304 PR  SURG PATH,LEVEL III: ICD-10-PCS | Mod: 26,,, | Performed by: PATHOLOGY

## 2023-09-15 NOTE — PROGRESS NOTES
[facility]  Wound Care  Progress Note    Problem List Items Addressed This Visit          Endocrine    Diabetic ulcer of toe of left foot associated with type 2 diabetes mellitus, with fat layer exposed - Primary    Overview     HPI: 83 yr old male with history of DM, venous insufficiency, noticed a callus to his toe and applied corn remover that made a wound on his toe, he went to his PCP and was placed on abx and referred to wound care.    Location: left distal great toe    Duration: 8-1-23    Context: DFU    Associated Signs & Symptoms: denies pain    Timing: n/a    Severity: n/a    Quality: n/a    Modifying Factors: n/a    9-8-23: here for wound care for DFU to left great toe.  9-15-23: here for wound care for DFU to left great toe.         Relevant Orders    Specimen to Pathology, Radiology Other, please specify (DFU to left great toe)       Orthopedic    Ulcer of left foot, with fat layer exposed     Physical Exam  Vitals reviewed.   Constitutional:       Appearance: Normal appearance.   HENT:      Head: Normocephalic.   Pulmonary:      Effort: Pulmonary effort is normal.   Musculoskeletal:         General: Normal range of motion.   Skin:     General: Skin is warm and dry.      Comments: DFU, holguin 2 to left distal great toe with adipose exposed. Wound has cauliflower like appearance.blood sugar well controlled.   Neurological:      Mental Status: He is alert and oriented to person, place, and time.   Psychiatric:         Mood and Affect: Mood normal.         Behavior: Behavior normal.         Thought Content: Thought content normal.         Judgment: Judgment normal.     Ulcer is about the same, has cauliflower like appeance, worrisome for skin cancer, injected with lidocaine 1% and small biopsy taken, pressure held and silver nitrate applied to bleeding. Culture results noted and awaiting topical abx, also on augmentin by mouth. Will use topical abx and santyl daily, keep clean, dry and  covered at all times. Will recheck in one week.   See wound doc progress notes. Documents will be scanned.        Briana LeBoeuf [facility]

## 2023-09-22 ENCOUNTER — OFFICE VISIT (OUTPATIENT)
Dept: WOUND CARE | Facility: HOSPITAL | Age: 83
End: 2023-09-22
Attending: NURSE PRACTITIONER
Payer: MEDICARE

## 2023-09-22 VITALS
HEART RATE: 78 BPM | RESPIRATION RATE: 18 BRPM | DIASTOLIC BLOOD PRESSURE: 63 MMHG | SYSTOLIC BLOOD PRESSURE: 119 MMHG | TEMPERATURE: 98 F

## 2023-09-22 DIAGNOSIS — M86.9 DIABETIC FOOT ULCER WITH OSTEOMYELITIS: ICD-10-CM

## 2023-09-22 DIAGNOSIS — E11.69 DIABETIC FOOT ULCER WITH OSTEOMYELITIS: ICD-10-CM

## 2023-09-22 DIAGNOSIS — L97.522 DIABETIC ULCER OF TOE OF LEFT FOOT ASSOCIATED WITH TYPE 2 DIABETES MELLITUS, WITH FAT LAYER EXPOSED: ICD-10-CM

## 2023-09-22 DIAGNOSIS — E11.621 DIABETIC FOOT ULCER WITH OSTEOMYELITIS: ICD-10-CM

## 2023-09-22 DIAGNOSIS — E11.621 DIABETIC ULCER OF TOE OF LEFT FOOT ASSOCIATED WITH TYPE 2 DIABETES MELLITUS, WITH FAT LAYER EXPOSED: ICD-10-CM

## 2023-09-22 DIAGNOSIS — L97.522 ULCER OF LEFT FOOT, WITH FAT LAYER EXPOSED: Primary | ICD-10-CM

## 2023-09-22 DIAGNOSIS — L97.522 NON-PRESSURE CHRONIC ULCER OF OTHER PART OF LEFT FOOT WITH FAT LAYER EXPOSED: ICD-10-CM

## 2023-09-22 DIAGNOSIS — L97.509 DIABETIC FOOT ULCER WITH OSTEOMYELITIS: ICD-10-CM

## 2023-09-22 PROCEDURE — 11042 DBRDMT SUBQ TIS 1ST 20SQCM/<: CPT

## 2023-09-22 NOTE — PROGRESS NOTES
[facility]  Wound Care  Progress Note    Problem List Items Addressed This Visit          Endocrine    Diabetic ulcer of toe of left foot associated with type 2 diabetes mellitus, with fat layer exposed    Overview     HPI: 83 yr old male with history of DM, venous insufficiency, noticed a callus to his toe and applied corn remover that made a wound on his toe, he went to his PCP and was placed on abx and referred to wound care.    Location: left distal great toe    Duration: 8-1-23    Context: DFU    Associated Signs & Symptoms: denies pain    Timing: n/a    Severity: n/a    Quality: n/a    Modifying Factors: n/a    9-8-23: here for wound care for DFU to left great toe.  9-15-23: here for wound care for DFU to left great toe.  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   Constitutional:       Appearance: Normal appearance.   HENT:      Head: Normocephalic.   Pulmonary:      Effort: Pulmonary effort is normal.   Musculoskeletal:         General: Normal range of motion.   Skin:     General: Skin is warm and dry.      Comments: DFU, holguin 2 to left distal great toe with adipose exposed.  Blood sugar is well controlled.   Neurological:      Mental Status: He is alert and oriented to person, place, and time.   Psychiatric:         Mood and Affect: Mood normal.         Behavior: Behavior normal.         Thought Content: Thought content normal.         Judgment: Judgment normal.     Biopsy was negative for malignancy, still very painful and toenail is split and lifting up, appt made with Dr. Figueredo (podiatry) for Monday for toenail removal. Culture results noted and pt received topical abx and santyl will use mixture  daily. Keep clean and dry and will recheck in one week. His potential to heal is good.  See wound doc progress notes. Documents will be scanned.        Briana HowardBoeuf  [facility]

## 2023-09-28 LAB
FINAL PATHOLOGIC DIAGNOSIS: NORMAL
GROSS: NORMAL
Lab: NORMAL
SUPPLEMENTAL DIAGNOSIS: NORMAL

## 2023-09-29 ENCOUNTER — OFFICE VISIT (OUTPATIENT)
Dept: WOUND CARE | Facility: HOSPITAL | Age: 83
End: 2023-09-29
Attending: NURSE PRACTITIONER
Payer: MEDICARE

## 2023-09-29 ENCOUNTER — HOSPITAL ENCOUNTER (OUTPATIENT)
Dept: RADIOLOGY | Facility: HOSPITAL | Age: 83
Discharge: HOME | End: 2023-09-29
Attending: NURSE PRACTITIONER
Payer: MEDICARE

## 2023-09-29 VITALS
DIASTOLIC BLOOD PRESSURE: 65 MMHG | RESPIRATION RATE: 18 BRPM | TEMPERATURE: 98 F | HEART RATE: 80 BPM | SYSTOLIC BLOOD PRESSURE: 109 MMHG

## 2023-09-29 DIAGNOSIS — L97.522 DIABETIC ULCER OF TOE OF LEFT FOOT ASSOCIATED WITH TYPE 2 DIABETES MELLITUS, WITH FAT LAYER EXPOSED: ICD-10-CM

## 2023-09-29 DIAGNOSIS — E11.621 DIABETIC ULCER OF TOE OF LEFT FOOT ASSOCIATED WITH TYPE 2 DIABETES MELLITUS, WITH FAT LAYER EXPOSED: ICD-10-CM

## 2023-09-29 DIAGNOSIS — L97.522 NON-PRESSURE CHRONIC ULCER OF OTHER PART OF LEFT FOOT WITH FAT LAYER EXPOSED: Primary | ICD-10-CM

## 2023-09-29 PROCEDURE — 73630 X-RAY EXAM OF FOOT: CPT | Mod: 26,LT,, | Performed by: RADIOLOGY

## 2023-09-29 PROCEDURE — 73630 XR FOOT COMPLETE 3 VIEW LEFT: ICD-10-PCS | Mod: 26,LT,, | Performed by: RADIOLOGY

## 2023-09-29 PROCEDURE — 73630 X-RAY EXAM OF FOOT: CPT | Mod: TC,LT

## 2023-09-29 PROCEDURE — 11042 DBRDMT SUBQ TIS 1ST 20SQCM/<: CPT

## 2023-09-29 NOTE — PROGRESS NOTES
[facility]  Wound Care  Progress Note    Problem List Items Addressed This Visit          Endocrine    Diabetic ulcer of toe of left foot associated with type 2 diabetes mellitus, with fat layer exposed    Overview     HPI: 83 yr old male with history of DM, venous insufficiency, noticed a callus to his toe and applied corn remover that made a wound on his toe, he went to his PCP and was placed on abx and referred to wound care.    Location: left distal great toe    Duration: 8-1-23    Context: DFU    Associated Signs & Symptoms: denies pain    Timing: n/a    Severity: n/a    Quality: n/a    Modifying Factors: n/a    9-8-23: here for wound care for DFU to left great toe.  9-15-23: here for wound care for DFU to left great toe.  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   Constitutional:       Appearance: Normal appearance.   HENT:      Head: Normocephalic.   Pulmonary:      Effort: Pulmonary effort is normal.   Musculoskeletal:         General: Normal range of motion.   Skin:     General: Skin is warm and dry.      Comments: DFU, holguin 2 to left distal great toe with adipose exposed.   Neurological:      Mental Status: He is alert and oriented to person, place, and time.   Psychiatric:         Mood and Affect: Mood normal.         Behavior: Behavior normal.         Thought Content: Thought content normal.         Judgment: Judgment normal.     Ulcer debrided, pt was seen by podiatry on Monday and had toenail removed. Ulcer to toe still has cauliflower appearance. Pt c/o severe pain, will recheck labs and xray. Will have pt see  Dr. Albert next week for surgical evaluation. Continue gentian violet to miesha wound , topical abx and santyl daily, Keep clean and dry.   See wound doc progress notes. Documents will be scanned.        Briana HowardBoeuf  [facility]

## 2023-10-05 ENCOUNTER — OFFICE VISIT (OUTPATIENT)
Dept: WOUND CARE | Facility: HOSPITAL | Age: 83
End: 2023-10-05
Attending: SURGERY
Payer: MEDICARE

## 2023-10-05 VITALS
HEART RATE: 76 BPM | RESPIRATION RATE: 18 BRPM | SYSTOLIC BLOOD PRESSURE: 111 MMHG | TEMPERATURE: 99 F | DIASTOLIC BLOOD PRESSURE: 67 MMHG

## 2023-10-05 DIAGNOSIS — L97.522 DIABETIC ULCER OF TOE OF LEFT FOOT ASSOCIATED WITH TYPE 2 DIABETES MELLITUS, WITH FAT LAYER EXPOSED: ICD-10-CM

## 2023-10-05 DIAGNOSIS — L97.522 NON-PRESSURE CHRONIC ULCER OF OTHER PART OF LEFT FOOT WITH FAT LAYER EXPOSED: Primary | ICD-10-CM

## 2023-10-05 DIAGNOSIS — L97.522 ULCER OF LEFT FOOT, WITH FAT LAYER EXPOSED: ICD-10-CM

## 2023-10-05 DIAGNOSIS — E11.621 DIABETIC ULCER OF TOE OF LEFT FOOT ASSOCIATED WITH TYPE 2 DIABETES MELLITUS, WITH FAT LAYER EXPOSED: ICD-10-CM

## 2023-10-05 PROCEDURE — 99499 UNLISTED E&M SERVICE: CPT | Mod: ,,, | Performed by: SURGERY

## 2023-10-05 PROCEDURE — 99499 NO LOS: ICD-10-PCS | Mod: ,,, | Performed by: SURGERY

## 2023-10-05 PROCEDURE — 17250 CHEM CAUT OF GRANLTJ TISSUE: CPT

## 2023-10-05 NOTE — ASSESSMENT & PLAN NOTE
Patient asked to see me for evaluation of the distal great toe.  There is a fungating type mass which has been biopsied and was negative.  X-ray show possible osteo.  MRI scheduled for next week.  Patient is too sensitive to do any actual debridement today.  Santyl is being applied.  Cauterized today with silver nitrate.  Patient was return next week.  This may require going to the operating room because of the sensitivity and debridement of the distal toe and possible removal of the distal bone.

## 2023-10-05 NOTE — PROGRESS NOTES
[facility]  Wound Care  Progress Note    Problem List Items Addressed This Visit       Diabetic ulcer of toe of left foot associated with type 2 diabetes mellitus, with fat layer exposed    Overview     HPI: 83 yr old male with history of DM, venous insufficiency, noticed a callus to his toe and applied corn remover that made a wound on his toe, he went to his PCP and was placed on abx and referred to wound care.    Location: left distal great toe    Duration: 8-1-23    Context: DFU    Associated Signs & Symptoms: denies pain    Timing: n/a    Severity: n/a    Quality: n/a    Modifying Factors: n/a    9-8-23: here for wound care for DFU to left great toe.  9-15-23: here for wound care for DFU to left great toe.  9-22-23: here for wound care for DFU to left great toe  9-29-23: here for wound care for DFU to left great toe         Current Assessment & Plan     Patient asked to see me for evaluation of the distal great toe.  There is a fungating type mass which has been biopsied and was negative.  X-ray show possible osteo.  MRI scheduled for next week.  Patient is too sensitive to do any actual debridement today.  Santyl is being applied.  Cauterized today with silver nitrate.  Patient was return next week.  This may require going to the operating room because of the sensitivity and debridement of the distal toe and possible removal of the distal bone.         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 Documents will be scanned.        Arsenio Albert  [facility]

## 2023-10-12 ENCOUNTER — OFFICE VISIT (OUTPATIENT)
Dept: WOUND CARE | Facility: HOSPITAL | Age: 83
End: 2023-10-12
Attending: SURGERY
Payer: MEDICARE

## 2023-10-12 ENCOUNTER — HOSPITAL ENCOUNTER (OUTPATIENT)
Dept: RADIOLOGY | Facility: HOSPITAL | Age: 83
Discharge: HOME | End: 2023-10-12
Attending: NURSE PRACTITIONER
Payer: MEDICARE

## 2023-10-12 VITALS
HEART RATE: 78 BPM | SYSTOLIC BLOOD PRESSURE: 115 MMHG | RESPIRATION RATE: 18 BRPM | TEMPERATURE: 97 F | DIASTOLIC BLOOD PRESSURE: 67 MMHG

## 2023-10-12 DIAGNOSIS — E11.621 DIABETIC ULCER OF TOE OF LEFT FOOT ASSOCIATED WITH TYPE 2 DIABETES MELLITUS, WITH FAT LAYER EXPOSED: ICD-10-CM

## 2023-10-12 DIAGNOSIS — L97.522 DIABETIC ULCER OF TOE OF LEFT FOOT ASSOCIATED WITH TYPE 2 DIABETES MELLITUS, WITH FAT LAYER EXPOSED: ICD-10-CM

## 2023-10-12 PROCEDURE — 73720 MRI LWR EXTREMITY W/O&W/DYE: CPT | Mod: TC,LT

## 2023-10-12 PROCEDURE — 25500020 PHARM REV CODE 255: Performed by: NURSE PRACTITIONER

## 2023-10-12 PROCEDURE — 73720 MRI FOOT (FOREFOOT) LEFT W W/O CONTRAST: ICD-10-PCS | Mod: 26,LT,, | Performed by: RADIOLOGY

## 2023-10-12 PROCEDURE — 73720 MRI LWR EXTREMITY W/O&W/DYE: CPT | Mod: 26,LT,, | Performed by: RADIOLOGY

## 2023-10-12 PROCEDURE — 99499 UNLISTED E&M SERVICE: CPT | Mod: ,,, | Performed by: SURGERY

## 2023-10-12 PROCEDURE — 99212 OFFICE O/P EST SF 10 MIN: CPT | Mod: 25

## 2023-10-12 PROCEDURE — A9585 GADOBUTROL INJECTION: HCPCS | Performed by: NURSE PRACTITIONER

## 2023-10-12 PROCEDURE — 99499 NO LOS: ICD-10-PCS | Mod: ,,, | Performed by: SURGERY

## 2023-10-12 RX ORDER — GADOBUTROL 604.72 MG/ML
7 INJECTION INTRAVENOUS
Status: COMPLETED | OUTPATIENT
Start: 2023-10-12 | End: 2023-10-12

## 2023-10-12 RX ORDER — GADOBUTROL 604.72 MG/ML
7 INJECTION INTRAVENOUS
Status: DISCONTINUED | OUTPATIENT
Start: 2023-10-12 | End: 2023-10-13 | Stop reason: HOSPADM

## 2023-10-12 RX ADMIN — GADOBUTROL 10 ML: 604.72 INJECTION INTRAVENOUS at 01:10

## 2023-10-12 NOTE — PROGRESS NOTES
[facility]  Wound Care  Progress Note    Problem List Items Addressed This Visit       Diabetic ulcer of toe of left foot associated with type 2 diabetes mellitus, with fat layer exposed    Overview     HPI: 83 yr old male with history of DM, venous insufficiency, noticed a callus to his toe and applied corn remover that made a wound on his toe, he went to his PCP and was placed on abx and referred to wound care.  He underwent biopsy which was negative for malignancy.  Cultures were taken.  E coli growing out.  Placed on antibiotic compound and Santyl.  He saw Dr. Albert last week.  It is too painful to debride.  MRI will be done today.  We will wait for the results of the MRI.  May need distal left great toe amputation.    Location: left distal great toe    Duration: 8-1-23    Context: DFU    Associated Signs & Symptoms: denies pain    Timing: n/a    Severity: n/a    Quality: n/a    Modifying Factors: n/a    9-8-23: here for wound care for DFU to left great toe.  9-15-23: here for wound care for DFU to left great toe.  9-22-23: here for wound care for DFU to left great toe  9-29-23: here for wound care for DFU to left great toe            See wound doc progress notes. Documents will be scanned.        Timothy Cline Jr  [facility]

## 2024-12-16 ENCOUNTER — OFFICE VISIT (OUTPATIENT)
Dept: WOUND CARE | Facility: HOSPITAL | Age: 84
End: 2024-12-16
Attending: SURGERY
Payer: MEDICARE

## 2024-12-16 VITALS — SYSTOLIC BLOOD PRESSURE: 149 MMHG | HEART RATE: 83 BPM | DIASTOLIC BLOOD PRESSURE: 83 MMHG

## 2024-12-16 DIAGNOSIS — L97.822 ULCER OF LEFT PRETIBIAL REGION WITH FAT LAYER EXPOSED: ICD-10-CM

## 2024-12-16 DIAGNOSIS — I87.332 CHRONIC VENOUS HYPERTENSION (IDIOPATHIC) WITH ULCER AND INFLAMMATION OF LEFT LOWER EXTREMITY: Primary | ICD-10-CM

## 2024-12-16 PROBLEM — L97.522 ULCER OF LEFT FOOT, WITH FAT LAYER EXPOSED: Status: RESOLVED | Noted: 2023-09-01 | Resolved: 2024-12-16

## 2024-12-16 PROBLEM — I83.029 VENOUS ULCER OF LEFT LOWER EXTREMITY WITH VARICOSE VEINS: Status: RESOLVED | Noted: 2019-09-23 | Resolved: 2024-12-16

## 2024-12-16 PROBLEM — L97.522 NON-PRESSURE CHRONIC ULCER OF OTHER PART OF LEFT FOOT WITH FAT LAYER EXPOSED: Status: RESOLVED | Noted: 2023-09-22 | Resolved: 2024-12-16

## 2024-12-16 PROBLEM — L97.322 NON-PRESSURE CHRONIC ULCER OF LEFT ANKLE WITH FAT LAYER EXPOSED: Status: RESOLVED | Noted: 2019-09-23 | Resolved: 2024-12-16

## 2024-12-16 PROBLEM — L97.929 VENOUS ULCER OF LEFT LOWER EXTREMITY WITH VARICOSE VEINS: Status: RESOLVED | Noted: 2019-09-23 | Resolved: 2024-12-16

## 2024-12-16 PROBLEM — E11.621 DIABETIC ULCER OF TOE OF LEFT FOOT ASSOCIATED WITH TYPE 2 DIABETES MELLITUS, WITH FAT LAYER EXPOSED: Status: RESOLVED | Noted: 2023-09-01 | Resolved: 2024-12-16

## 2024-12-16 PROBLEM — L97.522 DIABETIC ULCER OF TOE OF LEFT FOOT ASSOCIATED WITH TYPE 2 DIABETES MELLITUS, WITH FAT LAYER EXPOSED: Status: RESOLVED | Noted: 2023-09-01 | Resolved: 2024-12-16

## 2024-12-16 PROCEDURE — 99499 UNLISTED E&M SERVICE: CPT | Mod: ,,, | Performed by: SURGERY

## 2024-12-16 PROCEDURE — 99213 OFFICE O/P EST LOW 20 MIN: CPT

## 2024-12-16 PROCEDURE — 11042 DBRDMT SUBQ TIS 1ST 20SQCM/<: CPT

## 2024-12-16 NOTE — PROGRESS NOTES
[facility]  Wound Care  History and Physical    Problem List Items Addressed This Visit          Orthopedic    Chronic venous hypertension (idiopathic) with ulcer and inflammation of left lower extremity - Primary    Overview     HPI:  Patient with long-time history of chronic venous hypertension and prior venous ulcerations.  He has utilized stockings in the past.  Patient underwent venous ablation with Dr. Barnes of left leg 12/18/19.    Location: left lateral anterior lower leg    Duration:  3 weeks    Context: venous    Associated Signs & Symptoms: denies pain    Timing: n/a    Severity: n/a    Quality: n/a     Modifying Factors:  Type 2 diabetes  Patient is SYLVIA on the left leg was 0.89.             Current Assessment & Plan     Excisional debridement was performed of calcified dermis and subcutaneous tissue.  There was limited bleeding.  There is surrounding stasis dermatitis.    Begin Santyl.  Betamethasone to the periwound skin.  Light compression.         Ulcer of left pretibial region with fat layer exposed    Overview     Patient with sudden development of ulceration on the left lower leg.  There has been no associated pain with limited drainage.  Patient has a history of venous stasis and chronic venous hypertension of the left lower extremity.  He has had prior venous ulcerations.              History:    Past Medical History:   Diagnosis Date    Abscess of left thigh 10/14/2019    Patient presented 10/14/2019 with a several day history of a red tender area on the left anterior thigh.  There has been slight drainage.  There was a 3 x 3 cm area of erythema and tenderness of the left anterior thigh.  Incision and drainage was performed at that time.     Diabetic ulcer of toe of left foot associated with type 2 diabetes mellitus, with fat layer exposed 09/01/2023    HPI: 83 yr old male with history of DM, venous insufficiency, noticed a callus to his toe and applied corn remover that  made a wound on his toe, he went to his PCP and was placed on abx and referred to wound care.  He underwent biopsy which was negative for malignancy.  Cultures were taken.  E coli growing out.  Placed on antibiotic compound and Santyl.  He saw Dr. Albert last week.  It is too ina    History of blood clots     right leg    Hyperlipidemia     Hypertension     MRSA (methicillin resistant Staphylococcus aureus) infection 10/28/2019    Left thigh abscess revealed MRSA.  He has been treated with Bactrim.    Non-pressure chronic ulcer of left thigh with fat layer exposed 11/11/2019    Patient noted a wound on his left posterior thigh 2 weeks ago from 11/11/2019 when he noted pain when sitting on a bench.  At the time, he noted a wet area and subsequent wound. He denies burning his skin.  He has not really sure of the origin.  He has some tenderness when the area is touched.    Peripheral vascular disease        Past Surgical History:   Procedure Laterality Date    ANGIOPLASTY      EYE SURGERY      Left arm surgery      Right posterior leg skin graft         Family History   Problem Relation Name Age of Onset    Diabetes Mother      Heart disease Father      Diabetes Sister      Stroke Sister      Heart disease Brother          reports that he has been smoking cigarettes. He has never used smokeless tobacco. He reports current alcohol use. He reports that he does not use drugs.    has a current medication list which includes the following prescription(s): apixaban, clopidogrel, lisinopril, multivitamin, and simvastatin.    Allergies:  Patient has no known allergies.    Review of Systems:  Review of Systems   Constitutional:  Negative for chills, diaphoresis, fever, malaise/fatigue and weight loss.   Cardiovascular:  Negative for chest pain, orthopnea, claudication and leg swelling.       There were no vitals filed for this visit.      BMI:  There is no height or weight on file to calculate BMI.    Physical  "Exam:  Physical Exam  Vitals reviewed.   Constitutional:       General: He is not in acute distress.     Appearance: Normal appearance. He is normal weight.   HENT:      Head: Normocephalic.   Eyes:      General: No scleral icterus.  Cardiovascular:      Rate and Rhythm: Normal rate and regular rhythm.      Comments: Patient has bilateral femoral pulses.    I can not appreciate a posterior tibial or dorsalis pedis pulse in the left leg.  Pulmonary:      Effort: No respiratory distress.   Musculoskeletal:      Left lower leg: No edema.      Comments: Patient has amputation of the left great toe.  There is otherwise no left foot deformity.  See wound progress note for detailed wound description.     Skin:     General: Skin is warm and dry.   Neurological:      General: No focal deficit present.     A1C:  No results for input(s): "HGBA1C" in the last 2160 hours.  BMP:  No results for input(s): "GLU", "NA", "K", "CL", "CO2", "BUN", "CREATININE", "CALCIUM", "MG" in the last 2160 hours.   CBC:  No results for input(s): "WBC", "RBC", "HGB", "HCT", "PLT", "MCV", "MCH", "MCHC" in the last 2160 hours.  CMP:  No results for input(s): "GLU", "CALCIUM", "ALBUMIN", "PROT", "NA", "K", "CO2", "CL", "BUN", "ALKPHOS", "ALT", "AST", "BILITOT" in the last 2160 hours.    Invalid input(s): "CREATININ"  PREALBUMIN:  No results for input(s): "PREALBUMIN" in the last 2160 hours.  WOUND CULTURES:  No results for input(s): "LABAERO" in the last 2160 hours.        Plan:  See Wound Docs note for plan and follow up.        Arvin CHU Hebert [facility]    "

## 2024-12-16 NOTE — ASSESSMENT & PLAN NOTE
Excisional debridement was performed of calcified dermis and subcutaneous tissue.  There was limited bleeding.  There is surrounding stasis dermatitis.    Begin Santyl.  Betamethasone to the periwound skin.  Light compression.

## 2024-12-23 ENCOUNTER — OFFICE VISIT (OUTPATIENT)
Dept: WOUND CARE | Facility: HOSPITAL | Age: 84
End: 2024-12-23
Attending: SURGERY
Payer: MEDICARE

## 2024-12-23 VITALS — SYSTOLIC BLOOD PRESSURE: 140 MMHG | DIASTOLIC BLOOD PRESSURE: 77 MMHG | HEART RATE: 78 BPM

## 2024-12-23 DIAGNOSIS — L97.822 ULCER OF LEFT PRETIBIAL REGION WITH FAT LAYER EXPOSED: Primary | ICD-10-CM

## 2024-12-23 PROCEDURE — 99499 UNLISTED E&M SERVICE: CPT | Mod: ,,, | Performed by: SURGERY

## 2024-12-23 PROCEDURE — 11042 DBRDMT SUBQ TIS 1ST 20SQCM/<: CPT

## 2024-12-23 NOTE — ASSESSMENT & PLAN NOTE
Wound dried out completely nonviable tissue and slough.  No bleeding with debridement today.  Santyl was ordered but patient did not receive Santyl.  Using Mesalt.  Will need Santyl.  We will also need repeat evaluation for vascular disease.

## 2024-12-23 NOTE — PROGRESS NOTES
[facility]  Wound Care  Progress Note    Problem List Items Addressed This Visit       Ulcer of left pretibial region with fat layer exposed - Primary    Overview     Patient with sudden development of ulceration on the left lower leg.  There has been no associated pain with limited drainage.  Patient has a history of venous stasis and chronic venous hypertension of the left lower extremity.  He has had prior venous ulcerations.         Current Assessment & Plan     Wound dried out completely nonviable tissue and slough.  No bleeding with debridement today.  Santyl was ordered but patient did not receive Santyl.  Using Mesalt.  Will need Santyl.  We will also need repeat evaluation for vascular disease.            See wound doc progress notes. Documents will be scanned.        Travis Galvan  [facility]

## 2024-12-30 ENCOUNTER — OFFICE VISIT (OUTPATIENT)
Dept: WOUND CARE | Facility: HOSPITAL | Age: 84
End: 2024-12-30
Attending: SURGERY
Payer: MEDICARE

## 2024-12-30 VITALS — HEART RATE: 73 BPM | DIASTOLIC BLOOD PRESSURE: 78 MMHG | SYSTOLIC BLOOD PRESSURE: 142 MMHG

## 2024-12-30 DIAGNOSIS — I87.332 CHRONIC VENOUS HYPERTENSION (IDIOPATHIC) WITH ULCER AND INFLAMMATION OF LEFT LOWER EXTREMITY: Primary | ICD-10-CM

## 2024-12-30 PROCEDURE — 11042 DBRDMT SUBQ TIS 1ST 20SQCM/<: CPT

## 2024-12-30 PROCEDURE — 99499 UNLISTED E&M SERVICE: CPT | Mod: ,,, | Performed by: SURGERY

## 2024-12-30 NOTE — PROGRESS NOTES
[facility]  Wound Care  Progress Note    Problem List Items Addressed This Visit       Chronic venous hypertension (idiopathic) with ulcer and inflammation of left lower extremity - Primary    Overview     HPI:  Patient with long-time history of chronic venous hypertension and prior venous ulcerations.  He has utilized stockings in the past.  Patient underwent venous ablation with Dr. Barnes of left leg 12/18/19.    Location: left lateral anterior lower leg    Duration:  3 weeks    Context: venous    Associated Signs & Symptoms: denies pain    Timing: n/a    Severity: n/a    Quality: n/a     Modifying Factors:  Type 2 diabetes  Patient is SYLVIA on the left leg was 0.89.             Current Assessment & Plan     Excisional debridement was performed of calcified dermis and subcutaneous tissue.  There was limited bleeding.  There is surrounding stasis dermatitis.    Patient has not received Santyl.  We were using some Mesalt but it is getting too dry.  We will supplement silver gel today.  Betamethasone to the periwound skin.  Light compression.            See wound doc progress notes. Documents will be scanned.        Timothy Cline Jr  [facility]

## 2024-12-30 NOTE — ASSESSMENT & PLAN NOTE
Excisional debridement was performed of calcified dermis and subcutaneous tissue.  There was limited bleeding.  There is surrounding stasis dermatitis.    Patient has not received Santyl.  We were using some Mesalt but it is getting too dry.  We will supplement silver gel today.  Betamethasone to the periwound skin.  Light compression.

## 2025-01-06 ENCOUNTER — OFFICE VISIT (OUTPATIENT)
Dept: WOUND CARE | Facility: HOSPITAL | Age: 85
End: 2025-01-06
Attending: SURGERY
Payer: MEDICARE

## 2025-01-06 VITALS
TEMPERATURE: 98 F | HEART RATE: 83 BPM | SYSTOLIC BLOOD PRESSURE: 138 MMHG | DIASTOLIC BLOOD PRESSURE: 78 MMHG | RESPIRATION RATE: 18 BRPM

## 2025-01-06 DIAGNOSIS — L97.822 ULCER OF LEFT PRETIBIAL REGION WITH FAT LAYER EXPOSED: Primary | ICD-10-CM

## 2025-01-06 DIAGNOSIS — I87.332 CHRONIC VENOUS HYPERTENSION (IDIOPATHIC) WITH ULCER AND INFLAMMATION OF LEFT LOWER EXTREMITY: ICD-10-CM

## 2025-01-06 PROCEDURE — 99499 UNLISTED E&M SERVICE: CPT | Mod: ,,, | Performed by: SURGERY

## 2025-01-06 PROCEDURE — 11042 DBRDMT SUBQ TIS 1ST 20SQCM/<: CPT

## 2025-01-06 NOTE — PROGRESS NOTES
[facility]  Wound Care  Progress Note    Problem List Items Addressed This Visit          Orthopedic    Chronic venous hypertension (idiopathic) with ulcer and inflammation of left lower extremity    Overview     HPI:  Patient with long-time history of chronic venous hypertension and prior venous ulcerations.  He has utilized stockings in the past.  Patient underwent venous ablation with Dr. Barnes of left leg 12/18/19.    Location: left lateral anterior lower leg    Duration:  3 weeks    Context: venous    Associated Signs & Symptoms: denies pain    Timing: n/a    Severity: n/a    Quality: n/a     Modifying Factors:  Type 2 diabetes  Patient is SYLVIA on the left leg was 0.89.             Ulcer of left pretibial region with fat layer exposed - Primary    Overview     Patient with sudden development of ulceration on the left lower leg.  There has been no associated pain with limited drainage.  Patient has a history of venous stasis and chronic venous hypertension of the left lower extremity.  He has had prior venous ulcerations.         Current Assessment & Plan     Wound with mostly nonviable subcutaneous tissue.  Few spots of pink.  Excisional debridement performed to remove nonviable subcutaneous tissue.    Patient now has Santyl in his utilizing.  Wound is definitely more moist.  Continue Santyl.    Continue compression.            See wound doc progress notes. Documents will be scanned.        Arvin Crowe  [facility]

## 2025-01-06 NOTE — ASSESSMENT & PLAN NOTE
Wound with mostly nonviable subcutaneous tissue.  Few spots of pink.  Excisional debridement performed to remove nonviable subcutaneous tissue.    Patient now has Santyl in his utilizing.  Wound is definitely more moist.  Continue Santyl.    Continue compression.

## 2025-01-13 ENCOUNTER — OFFICE VISIT (OUTPATIENT)
Dept: WOUND CARE | Facility: HOSPITAL | Age: 85
End: 2025-01-13
Attending: SURGERY
Payer: MEDICARE

## 2025-01-13 VITALS
RESPIRATION RATE: 18 BRPM | HEART RATE: 82 BPM | DIASTOLIC BLOOD PRESSURE: 63 MMHG | SYSTOLIC BLOOD PRESSURE: 137 MMHG | TEMPERATURE: 98 F

## 2025-01-13 DIAGNOSIS — I87.332 CHRONIC VENOUS HYPERTENSION (IDIOPATHIC) WITH ULCER AND INFLAMMATION OF LEFT LOWER EXTREMITY: ICD-10-CM

## 2025-01-13 DIAGNOSIS — L97.822 ULCER OF LEFT PRETIBIAL REGION WITH FAT LAYER EXPOSED: Primary | ICD-10-CM

## 2025-01-13 PROCEDURE — 99499 UNLISTED E&M SERVICE: CPT | Mod: ,,, | Performed by: SURGERY

## 2025-01-13 PROCEDURE — 11042 DBRDMT SUBQ TIS 1ST 20SQCM/<: CPT

## 2025-01-13 NOTE — ASSESSMENT & PLAN NOTE
Wound mostly nonviable tissue but a few spots of granulation tissue.  No sign of infection.  Periwound area with some maceration.  Continued sharp debridement as needed.  Continue Santyl.  Continue compression.

## 2025-01-13 NOTE — PROGRESS NOTES
[facility]  Wound Care  Progress Note    Problem List Items Addressed This Visit       Ulcer of left pretibial region with fat layer exposed - Primary    Overview     Patient with sudden development of ulceration on the left lower leg.  There has been no associated pain with limited drainage.  Patient has a history of venous stasis and chronic venous hypertension of the left lower extremity.  He has had prior venous ulcerations.         Current Assessment & Plan     Wound mostly nonviable tissue but a few spots of granulation tissue.  No sign of infection.  Periwound area with some maceration.  Continued sharp debridement as needed.  Continue Santyl.  Continue compression.            See wound doc progress notes. Documents will be scanned.        Travis Galvan  [facility]

## 2025-01-20 ENCOUNTER — OFFICE VISIT (OUTPATIENT)
Dept: WOUND CARE | Facility: HOSPITAL | Age: 85
End: 2025-01-20
Attending: SURGERY
Payer: MEDICARE

## 2025-01-20 VITALS — DIASTOLIC BLOOD PRESSURE: 60 MMHG | HEART RATE: 80 BPM | SYSTOLIC BLOOD PRESSURE: 136 MMHG

## 2025-01-20 DIAGNOSIS — I87.332 CHRONIC VENOUS HYPERTENSION (IDIOPATHIC) WITH ULCER AND INFLAMMATION OF LEFT LOWER EXTREMITY: ICD-10-CM

## 2025-01-20 DIAGNOSIS — L97.822 ULCER OF LEFT PRETIBIAL REGION WITH FAT LAYER EXPOSED: Primary | ICD-10-CM

## 2025-01-20 PROCEDURE — 11042 DBRDMT SUBQ TIS 1ST 20SQCM/<: CPT

## 2025-01-20 PROCEDURE — 99499 UNLISTED E&M SERVICE: CPT | Mod: ,,, | Performed by: SURGERY

## 2025-01-20 NOTE — PROGRESS NOTES
[facility]  Wound Care  Progress Note    Problem List Items Addressed This Visit       Ulcer of left pretibial region with fat layer exposed - Primary    Overview     Patient with sudden development of ulceration on the left lower leg.  There has been no associated pain with limited drainage.  Patient has a history of venous stasis and chronic venous hypertension of the left lower extremity.  He has had prior venous ulcerations.         Current Assessment & Plan     Wound mostly nonviable tissue but a few spots of granulation tissue.  No sign of infection.  Periwound area with some maceration.  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  Paint periwound with gentian violet.  Continued sharp debridement as needed.  Continue Santyl.  Continue compression.               See wound doc progress notes. Documents will be scanned.        Timothy Cline Jr  [facility]

## 2025-01-20 NOTE — ASSESSMENT & PLAN NOTE
Wound mostly nonviable tissue but a few spots of granulation tissue.  No sign of infection.  Periwound area with some maceration.  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  Paint periwound with gentian violet.  Continued sharp debridement as needed.  Continue Santyl.  Continue compression.

## 2025-01-27 ENCOUNTER — OFFICE VISIT (OUTPATIENT)
Dept: WOUND CARE | Facility: HOSPITAL | Age: 85
End: 2025-01-27
Attending: SURGERY
Payer: MEDICARE

## 2025-01-27 VITALS — SYSTOLIC BLOOD PRESSURE: 131 MMHG | HEART RATE: 77 BPM | RESPIRATION RATE: 18 BRPM | DIASTOLIC BLOOD PRESSURE: 65 MMHG

## 2025-01-27 DIAGNOSIS — I87.332 CHRONIC VENOUS HYPERTENSION (IDIOPATHIC) WITH ULCER AND INFLAMMATION OF LEFT LOWER EXTREMITY: ICD-10-CM

## 2025-01-27 DIAGNOSIS — L97.822 ULCER OF LEFT PRETIBIAL REGION WITH FAT LAYER EXPOSED: Primary | ICD-10-CM

## 2025-01-27 PROCEDURE — 99499 UNLISTED E&M SERVICE: CPT | Mod: ,,, | Performed by: SURGERY

## 2025-01-27 PROCEDURE — 11042 DBRDMT SUBQ TIS 1ST 20SQCM/<: CPT

## 2025-01-27 NOTE — ASSESSMENT & PLAN NOTE
Patient continues with significant nonviable tissue in the wound.  There were a few granulation buds noted.    Excisional debridement performed to remove nonviable slough and nonviable tissue.    Continue Santyl.    Continue compression.  Continue gentian violet on the periwound skin.

## 2025-01-27 NOTE — PROGRESS NOTES
[facility]  Wound Care  Progress Note    Problem List Items Addressed This Visit          Orthopedic    Chronic venous hypertension (idiopathic) with ulcer and inflammation of left lower extremity    Overview     HPI:  Patient with long-time history of chronic venous hypertension and prior venous ulcerations.  He has utilized stockings in the past.  Patient underwent venous ablation with Dr. Barnes of left leg 12/18/19.    Location: left lateral anterior lower leg    Duration:  3 weeks    Context: venous    Associated Signs & Symptoms: denies pain    Timing: n/a    Severity: n/a    Quality: n/a     Modifying Factors:  Type 2 diabetes  Patient is SYLVIA on the left leg was 0.89.             Ulcer of left pretibial region with fat layer exposed - Primary    Overview     Patient with sudden development of ulceration on the left lower leg.  There has been no associated pain with limited drainage.  Patient has a history of venous stasis and chronic venous hypertension of the left lower extremity.  He has had prior venous ulcerations.         Current Assessment & Plan     Patient continues with significant nonviable tissue in the wound.  There were a few granulation buds noted.    Excisional debridement performed to remove nonviable slough and nonviable tissue.    Continue Santyl.    Continue compression.  Continue gentian violet on the periwound skin.            See wound doc progress notes. Documents will be scanned.        Arvin Crowe  [facility]

## 2025-02-03 ENCOUNTER — OFFICE VISIT (OUTPATIENT)
Dept: WOUND CARE | Facility: HOSPITAL | Age: 85
End: 2025-02-03
Attending: SURGERY
Payer: MEDICARE

## 2025-02-03 VITALS — DIASTOLIC BLOOD PRESSURE: 65 MMHG | HEART RATE: 76 BPM | SYSTOLIC BLOOD PRESSURE: 130 MMHG

## 2025-02-03 DIAGNOSIS — I87.332 CHRONIC VENOUS HYPERTENSION (IDIOPATHIC) WITH ULCER AND INFLAMMATION OF LEFT LOWER EXTREMITY: ICD-10-CM

## 2025-02-03 DIAGNOSIS — L97.822 ULCER OF LEFT PRETIBIAL REGION WITH FAT LAYER EXPOSED: Primary | ICD-10-CM

## 2025-02-03 PROCEDURE — 99499 UNLISTED E&M SERVICE: CPT | Mod: ,,, | Performed by: SURGERY

## 2025-02-03 PROCEDURE — 11042 DBRDMT SUBQ TIS 1ST 20SQCM/<: CPT

## 2025-02-03 NOTE — ASSESSMENT & PLAN NOTE
A few granulation buds noted.  Wound bed mostly nonviable fibrous type tissue.  No exposed bone or tendon.  No concerning drainage.  Continue Santyl.  Continue compression

## 2025-02-03 NOTE — PROGRESS NOTES
[facility]  Wound Care  Progress Note    Problem List Items Addressed This Visit       Ulcer of left pretibial region with fat layer exposed - Primary    Overview     Patient with sudden development of ulceration on the left lower leg.  There has been no associated pain with limited drainage.  Patient has a history of venous stasis and chronic venous hypertension of the left lower extremity.  He has had prior venous ulcerations.         Current Assessment & Plan     A few granulation buds noted.  Wound bed mostly nonviable fibrous type tissue.  No exposed bone or tendon.  No concerning drainage.  Continue Santyl.  Continue compression            See wound doc progress notes. Documents will be scanned.        Travis Galvan  [facility]

## 2025-02-10 ENCOUNTER — OFFICE VISIT (OUTPATIENT)
Dept: WOUND CARE | Facility: HOSPITAL | Age: 85
End: 2025-02-10
Attending: SURGERY
Payer: MEDICARE

## 2025-02-10 VITALS — DIASTOLIC BLOOD PRESSURE: 68 MMHG | SYSTOLIC BLOOD PRESSURE: 127 MMHG | RESPIRATION RATE: 18 BRPM | HEART RATE: 74 BPM

## 2025-02-10 DIAGNOSIS — L97.822 ULCER OF LEFT PRETIBIAL REGION WITH FAT LAYER EXPOSED: Primary | ICD-10-CM

## 2025-02-10 PROCEDURE — 99499 UNLISTED E&M SERVICE: CPT | Mod: ,,, | Performed by: SURGERY

## 2025-02-10 PROCEDURE — 11042 DBRDMT SUBQ TIS 1ST 20SQCM/<: CPT

## 2025-02-10 NOTE — ASSESSMENT & PLAN NOTE
A few granulation buds noted. Wound bed mostly nonviable fibrous type tissue. No exposed bone or tendon. No concerning drainage.  Sharp debridement with curette as well as a 15 blade scalpel was performed today.  Continue Santyl. Continue compression

## 2025-02-10 NOTE — PROGRESS NOTES
[facility]  Wound Care  Progress Note    Problem List Items Addressed This Visit       Ulcer of left pretibial region with fat layer exposed - Primary    Overview     Patient with sudden development of ulceration on the left lower leg.  There has been no associated pain with limited drainage.  Patient has a history of venous stasis and chronic venous hypertension of the left lower extremity.  He has had prior venous ulcerations.         Current Assessment & Plan     A few granulation buds noted. Wound bed mostly nonviable fibrous type tissue. No exposed bone or tendon. No concerning drainage.  Sharp debridement with curette as well as a 15 blade scalpel was performed today.  Continue Santyl. Continue compression             See wound doc progress notes. Documents will be scanned.        Timothy Cline Jr  [facility]

## 2025-02-17 ENCOUNTER — OFFICE VISIT (OUTPATIENT)
Dept: WOUND CARE | Facility: HOSPITAL | Age: 85
End: 2025-02-17
Attending: SURGERY
Payer: MEDICARE

## 2025-02-17 VITALS — SYSTOLIC BLOOD PRESSURE: 120 MMHG | HEART RATE: 72 BPM | DIASTOLIC BLOOD PRESSURE: 70 MMHG

## 2025-02-17 DIAGNOSIS — L97.822 ULCER OF LEFT PRETIBIAL REGION WITH FAT LAYER EXPOSED: Primary | ICD-10-CM

## 2025-02-17 DIAGNOSIS — I87.332 CHRONIC VENOUS HYPERTENSION (IDIOPATHIC) WITH ULCER AND INFLAMMATION OF LEFT LOWER EXTREMITY: ICD-10-CM

## 2025-02-17 PROCEDURE — 11042 DBRDMT SUBQ TIS 1ST 20SQCM/<: CPT

## 2025-02-20 NOTE — PROGRESS NOTES
[facility]  Wound Care  Progress Note    Problem List Items Addressed This Visit          Orthopedic    Chronic venous hypertension (idiopathic) with ulcer and inflammation of left lower extremity    Overview   HPI:  Patient with long-time history of chronic venous hypertension and prior venous ulcerations.  He has utilized stockings in the past.  Patient underwent venous ablation with Dr. Barnes of left leg 12/18/19.    Location: left lateral anterior lower leg    Duration:  3 weeks    Context: venous    Associated Signs & Symptoms: denies pain    Timing: n/a    Severity: n/a    Quality: n/a     Modifying Factors:  Type 2 diabetes  Patient is SYLVIA on the left leg was 0.89.             Ulcer of left pretibial region with fat layer exposed - Primary    Overview   Patient with sudden development of ulceration on the left lower leg.  There has been no associated pain with limited drainage.  Patient has a history of venous stasis and chronic venous hypertension of the left lower extremity.  He has had prior venous ulcerations.         Current Assessment & Plan   Wound has very little change.    There continues to be nonviable tissue in the wound occupying 80%.  There were few granulation buds.    Excisional debridement performed to remove nonviable tissue.    Continue Santyl.  Continue compression.            See wound doc progress notes. Documents will be scanned.        Arvin Crowe  [facility]

## 2025-02-24 ENCOUNTER — OFFICE VISIT (OUTPATIENT)
Dept: WOUND CARE | Facility: HOSPITAL | Age: 85
End: 2025-02-24
Attending: SURGERY
Payer: MEDICARE

## 2025-02-24 VITALS — DIASTOLIC BLOOD PRESSURE: 67 MMHG | SYSTOLIC BLOOD PRESSURE: 119 MMHG | HEART RATE: 75 BPM | RESPIRATION RATE: 18 BRPM

## 2025-02-24 DIAGNOSIS — I87.332 CHRONIC VENOUS HYPERTENSION (IDIOPATHIC) WITH ULCER AND INFLAMMATION OF LEFT LOWER EXTREMITY: ICD-10-CM

## 2025-02-24 DIAGNOSIS — L97.822 ULCER OF LEFT PRETIBIAL REGION WITH FAT LAYER EXPOSED: Primary | ICD-10-CM

## 2025-02-24 PROCEDURE — 11042 DBRDMT SUBQ TIS 1ST 20SQCM/<: CPT

## 2025-02-24 PROCEDURE — 99499 UNLISTED E&M SERVICE: CPT | Mod: ,,, | Performed by: SURGERY

## 2025-02-24 NOTE — PROGRESS NOTES
[facility]  Wound Care  Progress Note    Problem List Items Addressed This Visit       Ulcer of left pretibial region with fat layer exposed - Primary    Overview   Patient with sudden development of ulceration on the left lower leg.  There has been no associated pain with limited drainage.  Patient has a history of venous stasis and chronic venous hypertension of the left lower extremity.  He has had prior venous ulcerations.         Current Assessment & Plan   No sign of infection.  No concerning drainage.  Wound bed mostly nonviable tissue and slough but a few buds of granulation tissue.  Continued sharp debridement.  Patient unable to obtain Santyl until March 4th.  Use Mesalt until Santyl available.  Continue compression            See wound doc progress notes. Documents will be scanned.        Travis Galvan  [facility]

## 2025-02-24 NOTE — ASSESSMENT & PLAN NOTE
No sign of infection.  No concerning drainage.  Wound bed mostly nonviable tissue and slough but a few buds of granulation tissue.  Continued sharp debridement.  Patient unable to obtain Santyl until March 4th.  Use Mesalt until Santyl available.  Continue compression

## 2025-03-06 ENCOUNTER — OFFICE VISIT (OUTPATIENT)
Dept: WOUND CARE | Facility: HOSPITAL | Age: 85
End: 2025-03-06
Attending: SURGERY
Payer: MEDICARE

## 2025-03-06 VITALS — DIASTOLIC BLOOD PRESSURE: 68 MMHG | HEART RATE: 80 BPM | SYSTOLIC BLOOD PRESSURE: 120 MMHG

## 2025-03-06 DIAGNOSIS — I87.332 CHRONIC VENOUS HYPERTENSION (IDIOPATHIC) WITH ULCER AND INFLAMMATION OF LEFT LOWER EXTREMITY: ICD-10-CM

## 2025-03-06 DIAGNOSIS — I83.029 VENOUS ULCER OF LEFT LOWER EXTREMITY WITH VARICOSE VEINS: Primary | ICD-10-CM

## 2025-03-06 DIAGNOSIS — L97.929 VENOUS ULCER OF LEFT LOWER EXTREMITY WITH VARICOSE VEINS: Primary | ICD-10-CM

## 2025-03-06 PROCEDURE — 11042 DBRDMT SUBQ TIS 1ST 20SQCM/<: CPT

## 2025-03-06 NOTE — PROGRESS NOTES
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  Patient states the wound has been present for several weeks.  Patient says he has had previous ulcers on the right lower extremity.  Patient denies any previous history of a deep vein thrombosis.  Patient denies any lower extremity swelling. Patient says he has been putting over-the-counter cream on the wound for the past several weeks.  Patient denies any significant drainage.  He denies any significant pain fever or chills.  Patient gives a history of blockages in the lower extremities.  Patient says he had a stent placed in the right lower extremity February of 2019.  Patient does smoke daily.  Sharp debridement performed. Enluxtra and 2 layer compression.  SYLVIA is 0.93 on the left lower extremity. Based on his SYLVIA, arterial circulation is adequate for compression.         Current Assessment & Plan   No sign of infection.  No concerning drainage.  Wound bed mostly nonviable tissue and slough but a few buds of granulation tissue.  Continued sharp debridement.  Patient unable to obtain Santyl recently.  Use Mesalt until Santyl available.  Continue compression               See wound doc progress notes. Documents will be scanned.        Timothy Cline Jr  [facility]

## 2025-03-06 NOTE — ASSESSMENT & PLAN NOTE
No sign of infection.  No concerning drainage.  Wound bed mostly nonviable tissue and slough but a few buds of granulation tissue.  Continued sharp debridement.  Patient unable to obtain Santyl recently.  Use Mesalt until Santyl available.  Continue compression

## 2025-03-10 ENCOUNTER — OFFICE VISIT (OUTPATIENT)
Dept: WOUND CARE | Facility: HOSPITAL | Age: 85
End: 2025-03-10
Attending: SURGERY
Payer: MEDICARE

## 2025-03-10 VITALS — HEART RATE: 77 BPM | DIASTOLIC BLOOD PRESSURE: 69 MMHG | SYSTOLIC BLOOD PRESSURE: 118 MMHG | RESPIRATION RATE: 17 BRPM

## 2025-03-10 DIAGNOSIS — L97.822 ULCER OF LEFT PRETIBIAL REGION WITH FAT LAYER EXPOSED: Primary | ICD-10-CM

## 2025-03-10 DIAGNOSIS — I87.332 CHRONIC VENOUS HYPERTENSION (IDIOPATHIC) WITH ULCER AND INFLAMMATION OF LEFT LOWER EXTREMITY: ICD-10-CM

## 2025-03-10 PROCEDURE — 11042 DBRDMT SUBQ TIS 1ST 20SQCM/<: CPT

## 2025-03-10 PROCEDURE — 99499 UNLISTED E&M SERVICE: CPT | Mod: ,,, | Performed by: SURGERY

## 2025-03-10 NOTE — ASSESSMENT & PLAN NOTE
Patient is still unable to obtain Santyl.  Wound bed mostly nonviable tissue.  A few buds of granulation tissue.  Continue debridement is needed.  Continue Mesalt until Santyl available.  Continue compression

## 2025-03-10 NOTE — PROGRESS NOTES
[facility]  Wound Care  Progress Note    Problem List Items Addressed This Visit       Ulcer of left pretibial region with fat layer exposed - Primary    Overview   Patient with sudden development of ulceration on the left lower leg.  There has been no associated pain with limited drainage.  Patient has a history of venous stasis and chronic venous hypertension of the left lower extremity.  He has had prior venous ulcerations.         Current Assessment & Plan   Patient is still unable to obtain Santyl.  Wound bed mostly nonviable tissue.  A few buds of granulation tissue.  Continue debridement is needed.  Continue Mesalt until Santyl available.  Continue compression            See wound doc progress notes. Documents will be scanned.        Travis Galvan  [facility]

## 2025-03-17 ENCOUNTER — OFFICE VISIT (OUTPATIENT)
Dept: WOUND CARE | Facility: HOSPITAL | Age: 85
End: 2025-03-17
Attending: SURGERY
Payer: MEDICARE

## 2025-03-17 VITALS — HEART RATE: 78 BPM | DIASTOLIC BLOOD PRESSURE: 70 MMHG | SYSTOLIC BLOOD PRESSURE: 120 MMHG

## 2025-03-17 DIAGNOSIS — I87.332 CHRONIC VENOUS HYPERTENSION (IDIOPATHIC) WITH ULCER AND INFLAMMATION OF LEFT LOWER EXTREMITY: ICD-10-CM

## 2025-03-17 DIAGNOSIS — L97.822 ULCER OF LEFT PRETIBIAL REGION WITH FAT LAYER EXPOSED: Primary | ICD-10-CM

## 2025-03-17 PROCEDURE — 11042 DBRDMT SUBQ TIS 1ST 20SQCM/<: CPT

## 2025-03-17 PROCEDURE — 99499 UNLISTED E&M SERVICE: CPT | Mod: ,,, | Performed by: SURGERY

## 2025-03-17 NOTE — PROGRESS NOTES
[facility]  Wound Care  Progress Note    Problem List Items Addressed This Visit       Ulcer of left pretibial region with fat layer exposed - Primary    Overview   Patient with sudden development of ulceration on the left lower leg.  There has been no associated pain with limited drainage.  Patient has a history of venous stasis and chronic venous hypertension of the left lower extremity.  He has had prior venous ulcerations.         Current Assessment & Plan   Patient is finally received Santyl.  Wound bed mostly nonviable tissue with a few buds of granulation tissue.  No sign of infection.  No concerning edema.  Continue sharp debridement as needed.  Implement Santyl for enzymatic debridement            See wound doc progress notes. Documents will be scanned.        Travis Galvan  [facility]

## 2025-03-17 NOTE — ASSESSMENT & PLAN NOTE
Patient is finally received Santyl.  Wound bed mostly nonviable tissue with a few buds of granulation tissue.  No sign of infection.  No concerning edema.  Continue sharp debridement as needed.  Implement Santyl for enzymatic debridement

## 2025-03-24 ENCOUNTER — OFFICE VISIT (OUTPATIENT)
Dept: WOUND CARE | Facility: HOSPITAL | Age: 85
End: 2025-03-24
Attending: SURGERY
Payer: MEDICARE

## 2025-03-24 VITALS
SYSTOLIC BLOOD PRESSURE: 119 MMHG | TEMPERATURE: 98 F | RESPIRATION RATE: 18 BRPM | HEART RATE: 86 BPM | DIASTOLIC BLOOD PRESSURE: 74 MMHG

## 2025-03-24 DIAGNOSIS — L97.822 ULCER OF LEFT PRETIBIAL REGION WITH FAT LAYER EXPOSED: Primary | ICD-10-CM

## 2025-03-24 DIAGNOSIS — I87.332 CHRONIC VENOUS HYPERTENSION (IDIOPATHIC) WITH ULCER AND INFLAMMATION OF LEFT LOWER EXTREMITY: ICD-10-CM

## 2025-03-24 PROCEDURE — 99499 UNLISTED E&M SERVICE: CPT | Mod: ,,, | Performed by: SURGERY

## 2025-03-24 PROCEDURE — 11042 DBRDMT SUBQ TIS 1ST 20SQCM/<: CPT

## 2025-03-24 NOTE — PROGRESS NOTES
[facility]  Wound Care  Progress Note    Problem List Items Addressed This Visit          Orthopedic    Chronic venous hypertension (idiopathic) with ulcer and inflammation of left lower extremity    Overview   HPI:  Patient with long-time history of chronic venous hypertension and prior venous ulcerations.  He has utilized stockings in the past.  Patient underwent venous ablation with Dr. Barnes of left leg 12/18/19.    Location: left lateral anterior lower leg    Duration:  3 weeks    Context: venous    Associated Signs & Symptoms: denies pain    Timing: n/a    Severity: n/a    Quality: n/a     Modifying Factors:  Type 2 diabetes  Patient is SYLVIA on the left leg was 0.89.             Current Assessment & Plan   Continue compression therapy         Ulcer of left pretibial region with fat layer exposed - Primary    Overview   Patient with sudden development of ulceration on the left lower leg.  There has been no associated pain with limited drainage.  Patient has a history of venous stasis and chronic venous hypertension of the left lower extremity.  He has had prior venous ulcerations.         Current Assessment & Plan   Wound is improving.  There granulation buds at the base of the wound.  There is still nonviable tissue however.    Excisional debridement performed to remove nonviable tissue.    Tissue is fairly firm.  Continue Santyl.            See wound doc progress notes. Documents will be scanned.        Arvin Crowe  [facility]

## 2025-03-24 NOTE — ASSESSMENT & PLAN NOTE
Wound is improving.  There granulation buds at the base of the wound.  There is still nonviable tissue however.    Excisional debridement performed to remove nonviable tissue.    Tissue is fairly firm.  Continue Santyl.

## 2025-03-31 ENCOUNTER — OFFICE VISIT (OUTPATIENT)
Dept: WOUND CARE | Facility: HOSPITAL | Age: 85
End: 2025-03-31
Attending: SURGERY
Payer: MEDICARE

## 2025-03-31 VITALS — DIASTOLIC BLOOD PRESSURE: 80 MMHG | SYSTOLIC BLOOD PRESSURE: 120 MMHG | HEART RATE: 80 BPM

## 2025-03-31 DIAGNOSIS — I87.332 CHRONIC VENOUS HYPERTENSION (IDIOPATHIC) WITH ULCER AND INFLAMMATION OF LEFT LOWER EXTREMITY: Primary | ICD-10-CM

## 2025-03-31 DIAGNOSIS — L97.822 ULCER OF LEFT PRETIBIAL REGION WITH FAT LAYER EXPOSED: ICD-10-CM

## 2025-03-31 PROCEDURE — 99499 UNLISTED E&M SERVICE: CPT | Mod: ,,, | Performed by: SURGERY

## 2025-03-31 PROCEDURE — 11042 DBRDMT SUBQ TIS 1ST 20SQCM/<: CPT

## 2025-03-31 NOTE — PROGRESS NOTES
[facility]  Wound Care  Progress Note    Problem List Items Addressed This Visit       Chronic venous hypertension (idiopathic) with ulcer and inflammation of left lower extremity - Primary    Overview   HPI:  Patient with long-time history of chronic venous hypertension and prior venous ulcerations.  He has utilized stockings in the past.  Patient underwent venous ablation with Dr. Barnes of left leg 12/18/19.    Location: left lateral anterior lower leg    Duration:  3 weeks    Context: venous    Associated Signs & Symptoms: denies pain    Timing: n/a    Severity: n/a    Quality: n/a     Modifying Factors:  Type 2 diabetes  Patient is SYLVIA on the left leg was 0.89.             Current Assessment & Plan   Continue sharp debridement to remove slough.  Continue compression therapy          Ulcer of left pretibial region with fat layer exposed    Overview   Patient with sudden development of ulceration on the left lower leg.  There has been no associated pain with limited drainage.  Patient has a history of venous stasis and chronic venous hypertension of the left lower extremity.  He has had prior venous ulcerations.         Current Assessment & Plan   Wound is improving.  There granulation buds at the base of the wound.  There is still nonviable tissue however.    Excisional debridement performed to remove nonviable tissue.    Tissue is fairly firm.  Continue Santyl.            See wound doc progress notes. Documents will be scanned.        Timothy Cline Jr  [facility]

## 2025-04-07 ENCOUNTER — OFFICE VISIT (OUTPATIENT)
Dept: WOUND CARE | Facility: HOSPITAL | Age: 85
End: 2025-04-07
Attending: SURGERY
Payer: MEDICARE

## 2025-04-07 VITALS
SYSTOLIC BLOOD PRESSURE: 119 MMHG | TEMPERATURE: 98 F | HEART RATE: 82 BPM | DIASTOLIC BLOOD PRESSURE: 78 MMHG | RESPIRATION RATE: 17 BRPM

## 2025-04-07 DIAGNOSIS — L97.822 ULCER OF LEFT PRETIBIAL REGION WITH FAT LAYER EXPOSED: ICD-10-CM

## 2025-04-07 DIAGNOSIS — I87.332 CHRONIC VENOUS HYPERTENSION (IDIOPATHIC) WITH ULCER AND INFLAMMATION OF LEFT LOWER EXTREMITY: Primary | ICD-10-CM

## 2025-04-07 PROCEDURE — 99499 UNLISTED E&M SERVICE: CPT | Mod: ,,, | Performed by: SURGERY

## 2025-04-07 PROCEDURE — 11042 DBRDMT SUBQ TIS 1ST 20SQCM/<: CPT

## 2025-04-07 NOTE — PROGRESS NOTES
[facility]  Wound Care  Progress Note    Problem List Items Addressed This Visit       Chronic venous hypertension (idiopathic) with ulcer and inflammation of left lower extremity - Primary    Overview   HPI:  Patient with long-time history of chronic venous hypertension and prior venous ulcerations.  He has utilized stockings in the past.  Patient underwent venous ablation with Dr. Barnes of left leg 12/18/19.    Location: left lateral anterior lower leg    Duration:  3 weeks    Context: venous    Associated Signs & Symptoms: denies pain    Timing: n/a    Severity: n/a    Quality: n/a     Modifying Factors:  Type 2 diabetes  Patient is SYLVIA on the left leg was 0.89.             Current Assessment & Plan   Continue sharp debridement to remove slough.  Continue compression therapy  Patient seeing Dr. Barnes cardiology for venous and arterial studies         Ulcer of left pretibial region with fat layer exposed    Overview   Patient with sudden development of ulceration on the left lower leg.  There has been no associated pain with limited drainage.  Patient has a history of venous stasis and chronic venous hypertension of the left lower extremity.  He has had prior venous ulcerations.         Current Assessment & Plan   Wound is improving.  There granulation buds at the base of the wound.  There is still nonviable tissue however.    Excisional debridement performed to remove nonviable tissue.    Continue Santyl.  Patient is seeing Dr. Barnes with Cardiology for venous and arterial studies            See wound doc progress notes. Documents will be scanned.        Timothy Cline Jr  [facility]

## 2025-04-07 NOTE — ASSESSMENT & PLAN NOTE
Continue sharp debridement to remove slough.  Continue compression therapy  Patient seeing Dr. Barnes cardiology for venous and arterial studies

## 2025-04-07 NOTE — ASSESSMENT & PLAN NOTE
Wound is improving.  There granulation buds at the base of the wound.  There is still nonviable tissue however.    Excisional debridement performed to remove nonviable tissue.    Continue Santyl.  Patient is seeing Dr. Barnes with Cardiology for venous and arterial studies

## 2025-04-14 ENCOUNTER — OFFICE VISIT (OUTPATIENT)
Dept: WOUND CARE | Facility: HOSPITAL | Age: 85
End: 2025-04-14
Attending: SURGERY
Payer: MEDICARE

## 2025-04-14 VITALS
HEART RATE: 80 BPM | DIASTOLIC BLOOD PRESSURE: 76 MMHG | SYSTOLIC BLOOD PRESSURE: 122 MMHG | RESPIRATION RATE: 18 BRPM | TEMPERATURE: 98 F

## 2025-04-14 DIAGNOSIS — L97.822 ULCER OF LEFT PRETIBIAL REGION WITH FAT LAYER EXPOSED: Primary | ICD-10-CM

## 2025-04-14 DIAGNOSIS — I87.332 CHRONIC VENOUS HYPERTENSION (IDIOPATHIC) WITH ULCER AND INFLAMMATION OF LEFT LOWER EXTREMITY: ICD-10-CM

## 2025-04-14 PROCEDURE — 99499 UNLISTED E&M SERVICE: CPT | Mod: ,,, | Performed by: SURGERY

## 2025-04-14 PROCEDURE — 11042 DBRDMT SUBQ TIS 1ST 20SQCM/<: CPT

## 2025-04-14 NOTE — ASSESSMENT & PLAN NOTE
Wound with small buds of granulation.  Most of the wound bed nonviable tissue.  Continue debridement is needed.  Continue Santyl for enzymatic debridement.  Patient has procedure scheduled with cardiology next week.

## 2025-04-14 NOTE — PROGRESS NOTES
[facility]  Wound Care  Progress Note    Problem List Items Addressed This Visit       Ulcer of left pretibial region with fat layer exposed - Primary    Overview   Patient with sudden development of ulceration on the left lower leg.  There has been no associated pain with limited drainage.  Patient has a history of venous stasis and chronic venous hypertension of the left lower extremity.  He has had prior venous ulcerations.         Current Assessment & Plan   Wound with small buds of granulation.  Most of the wound bed nonviable tissue.  Continue debridement is needed.  Continue Santyl for enzymatic debridement.  Patient has procedure scheduled with cardiology next week.            See wound doc progress notes. Documents will be scanned.        Travis Galvan  [facility]

## 2025-04-21 ENCOUNTER — OFFICE VISIT (OUTPATIENT)
Dept: WOUND CARE | Facility: HOSPITAL | Age: 85
End: 2025-04-21
Attending: SURGERY
Payer: MEDICARE

## 2025-04-21 VITALS
DIASTOLIC BLOOD PRESSURE: 72 MMHG | RESPIRATION RATE: 18 BRPM | SYSTOLIC BLOOD PRESSURE: 119 MMHG | HEART RATE: 80 BPM | TEMPERATURE: 98 F

## 2025-04-21 DIAGNOSIS — L97.822 ULCER OF LEFT PRETIBIAL REGION WITH FAT LAYER EXPOSED: Primary | ICD-10-CM

## 2025-04-21 DIAGNOSIS — I87.332 CHRONIC VENOUS HYPERTENSION (IDIOPATHIC) WITH ULCER AND INFLAMMATION OF LEFT LOWER EXTREMITY: ICD-10-CM

## 2025-04-21 PROCEDURE — 11042 DBRDMT SUBQ TIS 1ST 20SQCM/<: CPT

## 2025-04-21 PROCEDURE — 99499 UNLISTED E&M SERVICE: CPT | Mod: ,,, | Performed by: SURGERY

## 2025-04-21 NOTE — ASSESSMENT & PLAN NOTE
Wound is beginning to granulate.  It is much improved.  There was much less nonviable tissue.    Excisional debridement performed to remove nonviable slough and tissue.    Continue Santyl.    Patient to have angiogram done today.

## 2025-04-21 NOTE — PROGRESS NOTES
[facility]  Wound Care  Progress Note    Problem List Items Addressed This Visit          Orthopedic    Chronic venous hypertension (idiopathic) with ulcer and inflammation of left lower extremity    Overview   HPI:  Patient with long-time history of chronic venous hypertension and prior venous ulcerations.  He has utilized stockings in the past.  Patient underwent venous ablation with Dr. Barnes of left leg 12/18/19.    Location: left lateral anterior lower leg    Duration:  3 weeks    Context: venous    Associated Signs & Symptoms: denies pain    Timing: n/a    Severity: n/a    Quality: n/a     Modifying Factors:  Type 2 diabetes  Patient is SYLVIA on the left leg was 0.89.             Ulcer of left pretibial region with fat layer exposed - Primary    Overview   Patient with sudden development of ulceration on the left lower leg.  There has been no associated pain with limited drainage.  Patient has a history of venous stasis and chronic venous hypertension of the left lower extremity.  He has had prior venous ulcerations.         Current Assessment & Plan   Wound is beginning to granulate.  It is much improved.  There was much less nonviable tissue.    Excisional debridement performed to remove nonviable slough and tissue.    Continue Santyl.    Patient to have angiogram done today.            See wound doc progress notes. Documents will be scanned.        Arvin Crowe  [facility]

## 2025-04-28 ENCOUNTER — OFFICE VISIT (OUTPATIENT)
Dept: WOUND CARE | Facility: HOSPITAL | Age: 85
End: 2025-04-28
Attending: SURGERY
Payer: MEDICARE

## 2025-04-28 VITALS
RESPIRATION RATE: 18 BRPM | TEMPERATURE: 99 F | HEART RATE: 75 BPM | SYSTOLIC BLOOD PRESSURE: 121 MMHG | DIASTOLIC BLOOD PRESSURE: 78 MMHG

## 2025-04-28 DIAGNOSIS — L97.822 ULCER OF LEFT PRETIBIAL REGION WITH FAT LAYER EXPOSED: Primary | ICD-10-CM

## 2025-04-28 PROCEDURE — 11042 DBRDMT SUBQ TIS 1ST 20SQCM/<: CPT

## 2025-04-28 PROCEDURE — 99499 UNLISTED E&M SERVICE: CPT | Mod: ,,, | Performed by: SURGERY

## 2025-04-28 NOTE — PROGRESS NOTES
[facility]  Wound Care  Progress Note    Problem List Items Addressed This Visit       Ulcer of left pretibial region with fat layer exposed - Primary    Overview   Patient with sudden development of ulceration on the left lower leg.  There has been no associated pain with limited drainage.  Patient has a history of venous stasis and chronic venous hypertension of the left lower extremity.  He has had prior venous ulcerations.         Current Assessment & Plan   Wound improving.  More granulation tissue present.  Last nonviable tissue.  Continue debridement is needed.  Continue Santyl.  Angiogram performed last week.  Results not available at this time for review.  Patient states he has another procedure with Dr. Barnes planned for next week.            See wound doc progress notes. Documents will be scanned.        Travis Galvan  [facility]

## 2025-04-28 NOTE — ASSESSMENT & PLAN NOTE
Wound improving.  More granulation tissue present.  Last nonviable tissue.  Continue debridement is needed.  Continue Santyl.  Angiogram performed last week.  Results not available at this time for review.  Patient states he has another procedure with Dr. Barnes planned for next week.

## 2025-05-12 ENCOUNTER — OFFICE VISIT (OUTPATIENT)
Dept: WOUND CARE | Facility: HOSPITAL | Age: 85
End: 2025-05-12
Attending: SURGERY
Payer: MEDICARE

## 2025-05-12 VITALS
DIASTOLIC BLOOD PRESSURE: 76 MMHG | HEART RATE: 76 BPM | RESPIRATION RATE: 18 BRPM | SYSTOLIC BLOOD PRESSURE: 124 MMHG | TEMPERATURE: 99 F

## 2025-05-12 DIAGNOSIS — I87.332 CHRONIC VENOUS HYPERTENSION (IDIOPATHIC) WITH ULCER AND INFLAMMATION OF LEFT LOWER EXTREMITY: ICD-10-CM

## 2025-05-12 DIAGNOSIS — L97.822 ULCER OF LEFT PRETIBIAL REGION WITH FAT LAYER EXPOSED: Primary | ICD-10-CM

## 2025-05-12 PROCEDURE — 11042 DBRDMT SUBQ TIS 1ST 20SQCM/<: CPT

## 2025-05-12 PROCEDURE — 99499 UNLISTED E&M SERVICE: CPT | Mod: ,,, | Performed by: SURGERY

## 2025-05-12 NOTE — ASSESSMENT & PLAN NOTE
The character of the wound overall is markedly improved.  There is improved granulation.  Excisional debridement performed to remove nonviable slough.    Continue Santyl.

## 2025-05-12 NOTE — PROGRESS NOTES
[facility]  Wound Care  Progress Note    Problem List Items Addressed This Visit          Orthopedic    Chronic venous hypertension (idiopathic) with ulcer and inflammation of left lower extremity    Overview   HPI:  Patient with long-time history of chronic venous hypertension and prior venous ulcerations.  He has utilized stockings in the past.  Patient underwent venous ablation with Dr. Barnes of left leg 12/18/19.    Location: left lateral anterior lower leg    Duration:  3 weeks    Context: venous    Associated Signs & Symptoms: denies pain    Timing: n/a    Severity: n/a    Quality: n/a     Modifying Factors:  Type 2 diabetes  Patient is SYLVIA on the left leg was 0.89.  Angiogram was performed of the left lower extremity.  There was no notable disease.  Intervention was not performed.             Current Assessment & Plan   Continue compression therapy and leg elevation.         Ulcer of left pretibial region with fat layer exposed - Primary    Overview   Patient with sudden development of ulceration on the left lower leg.  There has been no associated pain with limited drainage.  Patient has a history of venous stasis and chronic venous hypertension of the left lower extremity.  He has had prior venous ulcerations.         Current Assessment & Plan   The character of the wound overall is markedly improved.  There is improved granulation.  Excisional debridement performed to remove nonviable slough.    Continue Santyl.            See wound doc progress notes. Documents will be scanned.        Arvin Crowe  [facility]

## 2025-05-19 ENCOUNTER — OFFICE VISIT (OUTPATIENT)
Dept: WOUND CARE | Facility: HOSPITAL | Age: 85
End: 2025-05-19
Attending: SURGERY
Payer: MEDICARE

## 2025-05-19 VITALS
RESPIRATION RATE: 20 BRPM | SYSTOLIC BLOOD PRESSURE: 122 MMHG | DIASTOLIC BLOOD PRESSURE: 76 MMHG | HEART RATE: 86 BPM | TEMPERATURE: 98 F

## 2025-05-19 DIAGNOSIS — L97.929 VENOUS ULCER OF LEFT LOWER EXTREMITY WITH VARICOSE VEINS: ICD-10-CM

## 2025-05-19 DIAGNOSIS — I83.029 VENOUS ULCER OF LEFT LOWER EXTREMITY WITH VARICOSE VEINS: ICD-10-CM

## 2025-05-19 DIAGNOSIS — L97.822 ULCER OF LEFT PRETIBIAL REGION WITH FAT LAYER EXPOSED: Primary | ICD-10-CM

## 2025-05-19 DIAGNOSIS — I87.332 CHRONIC VENOUS HYPERTENSION (IDIOPATHIC) WITH ULCER AND INFLAMMATION OF LEFT LOWER EXTREMITY: ICD-10-CM

## 2025-05-19 PROCEDURE — 11042 DBRDMT SUBQ TIS 1ST 20SQCM/<: CPT

## 2025-05-19 NOTE — PROGRESS NOTES
[facility]  Wound Care  Progress Note    Problem List Items Addressed This Visit       Ulcer of left pretibial region with fat layer exposed - Primary    Overview   Patient with sudden development of ulceration on the left lower leg.  There has been no associated pain with limited drainage.  Patient has a history of venous stasis and chronic venous hypertension of the left lower extremity.  He has had prior venous ulcerations.         Current Assessment & Plan   Wound continues to improve.  Mostly granulation with minimal nonviable tissue.  Continue debridement is needed.  Continue Santyl for enzymatic debridement.  Continue compression            See wound doc progress notes. Documents will be scanned.        Travis Galvan  [facility]

## 2025-05-19 NOTE — ASSESSMENT & PLAN NOTE
Wound continues to improve.  Mostly granulation with minimal nonviable tissue.  Continue debridement is needed.  Continue Santyl for enzymatic debridement.  Continue compression

## 2025-06-02 ENCOUNTER — OFFICE VISIT (OUTPATIENT)
Dept: WOUND CARE | Facility: HOSPITAL | Age: 85
End: 2025-06-02
Attending: SURGERY
Payer: MEDICARE

## 2025-06-02 VITALS
HEART RATE: 85 BPM | TEMPERATURE: 99 F | SYSTOLIC BLOOD PRESSURE: 126 MMHG | RESPIRATION RATE: 18 BRPM | DIASTOLIC BLOOD PRESSURE: 80 MMHG

## 2025-06-02 DIAGNOSIS — L97.822 ULCER OF LEFT PRETIBIAL REGION WITH FAT LAYER EXPOSED: Primary | ICD-10-CM

## 2025-06-02 DIAGNOSIS — I87.332 CHRONIC VENOUS HYPERTENSION (IDIOPATHIC) WITH ULCER AND INFLAMMATION OF LEFT LOWER EXTREMITY: ICD-10-CM

## 2025-06-02 PROCEDURE — 99499 UNLISTED E&M SERVICE: CPT | Mod: ,,, | Performed by: SURGERY

## 2025-06-02 PROCEDURE — 11042 DBRDMT SUBQ TIS 1ST 20SQCM/<: CPT

## 2025-06-09 ENCOUNTER — OFFICE VISIT (OUTPATIENT)
Dept: WOUND CARE | Facility: HOSPITAL | Age: 85
End: 2025-06-09
Attending: SURGERY
Payer: MEDICARE

## 2025-06-09 VITALS
TEMPERATURE: 99 F | RESPIRATION RATE: 18 BRPM | SYSTOLIC BLOOD PRESSURE: 124 MMHG | DIASTOLIC BLOOD PRESSURE: 77 MMHG | HEART RATE: 86 BPM

## 2025-06-09 DIAGNOSIS — L97.822 ULCER OF LEFT PRETIBIAL REGION WITH FAT LAYER EXPOSED: Primary | ICD-10-CM

## 2025-06-09 PROCEDURE — 99499 UNLISTED E&M SERVICE: CPT | Mod: ,,, | Performed by: SURGERY

## 2025-06-09 PROCEDURE — 11042 DBRDMT SUBQ TIS 1ST 20SQCM/<: CPT

## 2025-06-09 NOTE — ASSESSMENT & PLAN NOTE
Wound remains clean.  Granulation present but still nonviable tissue.  Continue debridement as needed.  Continue enzymatic debridement with Santyl.

## 2025-06-09 NOTE — PROGRESS NOTES
[facility]  Wound Care  Progress Note    Problem List Items Addressed This Visit       Ulcer of left pretibial region with fat layer exposed - Primary    Overview   Patient with sudden development of ulceration on the left lower leg.  There has been no associated pain with limited drainage.  Patient has a history of venous stasis and chronic venous hypertension of the left lower extremity.  He has had prior venous ulcerations.         Current Assessment & Plan   Wound remains clean.  Granulation present but still nonviable tissue.  Continue debridement as needed.  Continue enzymatic debridement with Santyl.            See wound doc progress notes. Documents will be scanned.        Travis Galvan  [facility]

## 2025-06-16 ENCOUNTER — OFFICE VISIT (OUTPATIENT)
Dept: WOUND CARE | Facility: HOSPITAL | Age: 85
End: 2025-06-16
Attending: SURGERY
Payer: MEDICARE

## 2025-06-16 VITALS
DIASTOLIC BLOOD PRESSURE: 74 MMHG | RESPIRATION RATE: 18 BRPM | SYSTOLIC BLOOD PRESSURE: 127 MMHG | HEART RATE: 80 BPM | TEMPERATURE: 98 F

## 2025-06-16 DIAGNOSIS — L97.822 ULCER OF LEFT PRETIBIAL REGION WITH FAT LAYER EXPOSED: Primary | ICD-10-CM

## 2025-06-16 DIAGNOSIS — I87.332 CHRONIC VENOUS HYPERTENSION (IDIOPATHIC) WITH ULCER AND INFLAMMATION OF LEFT LOWER EXTREMITY: ICD-10-CM

## 2025-06-16 PROCEDURE — 11042 DBRDMT SUBQ TIS 1ST 20SQCM/<: CPT

## 2025-06-16 PROCEDURE — 99499 UNLISTED E&M SERVICE: CPT | Mod: ,,, | Performed by: SURGERY

## 2025-06-16 NOTE — PROGRESS NOTES
[facility]  Wound Care  Progress Note    Problem List Items Addressed This Visit       Ulcer of left pretibial region with fat layer exposed - Primary    Overview   Patient with sudden development of ulceration on the left lower leg.  There has been no associated pain with limited drainage.  Patient has a history of venous stasis and chronic venous hypertension of the left lower extremity.  He has had prior venous ulcerations.         Current Assessment & Plan   Wound remains clean.  Granulation present but still nonviable tissue.  Continue debridement as needed.  Continue enzymatic debridement with Santyl.             See wound doc progress notes. Documents will be scanned.        Timothy Cline Jr  [facility]

## 2025-06-23 ENCOUNTER — OFFICE VISIT (OUTPATIENT)
Dept: WOUND CARE | Facility: HOSPITAL | Age: 85
End: 2025-06-23
Attending: SURGERY
Payer: MEDICARE

## 2025-06-23 VITALS
TEMPERATURE: 99 F | RESPIRATION RATE: 17 BRPM | HEART RATE: 77 BPM | DIASTOLIC BLOOD PRESSURE: 78 MMHG | SYSTOLIC BLOOD PRESSURE: 126 MMHG

## 2025-06-23 DIAGNOSIS — I87.332 CHRONIC VENOUS HYPERTENSION (IDIOPATHIC) WITH ULCER AND INFLAMMATION OF LEFT LOWER EXTREMITY: ICD-10-CM

## 2025-06-23 DIAGNOSIS — L97.822 ULCER OF LEFT PRETIBIAL REGION WITH FAT LAYER EXPOSED: Primary | ICD-10-CM

## 2025-06-23 PROCEDURE — 99499 UNLISTED E&M SERVICE: CPT | Mod: ,,, | Performed by: SURGERY

## 2025-06-23 PROCEDURE — 11042 DBRDMT SUBQ TIS 1ST 20SQCM/<: CPT

## 2025-06-23 NOTE — ASSESSMENT & PLAN NOTE
Wound slowly improving.  More granulation tissue present.  Still moderate nonviable tissue.  Continued sharp debridement.  Continue Santyl for enzymatic debridement.  Periwound area improve.

## 2025-06-23 NOTE — PROGRESS NOTES
[facility]  Wound Care  Progress Note    Problem List Items Addressed This Visit       Ulcer of left pretibial region with fat layer exposed - Primary    Overview   Patient with sudden development of ulceration on the left lower leg.  There has been no associated pain with limited drainage.  Patient has a history of venous stasis and chronic venous hypertension of the left lower extremity.  He has had prior venous ulcerations.         Current Assessment & Plan   Wound slowly improving.  More granulation tissue present.  Still moderate nonviable tissue.  Continued sharp debridement.  Continue Santyl for enzymatic debridement.  Periwound area improve.            See wound doc progress notes. Documents will be scanned.        Travis Galvan  [facility]

## 2025-06-30 ENCOUNTER — OFFICE VISIT (OUTPATIENT)
Dept: WOUND CARE | Facility: HOSPITAL | Age: 85
End: 2025-06-30
Attending: SURGERY
Payer: MEDICARE

## 2025-06-30 VITALS
TEMPERATURE: 99 F | RESPIRATION RATE: 18 BRPM | HEART RATE: 75 BPM | DIASTOLIC BLOOD PRESSURE: 74 MMHG | SYSTOLIC BLOOD PRESSURE: 123 MMHG

## 2025-06-30 DIAGNOSIS — L97.822 ULCER OF LEFT PRETIBIAL REGION WITH FAT LAYER EXPOSED: Primary | ICD-10-CM

## 2025-06-30 DIAGNOSIS — I87.332 CHRONIC VENOUS HYPERTENSION (IDIOPATHIC) WITH ULCER AND INFLAMMATION OF LEFT LOWER EXTREMITY: ICD-10-CM

## 2025-06-30 PROCEDURE — 11042 DBRDMT SUBQ TIS 1ST 20SQCM/<: CPT

## 2025-06-30 PROCEDURE — 99499 UNLISTED E&M SERVICE: CPT | Mod: ,,, | Performed by: SURGERY

## 2025-06-30 NOTE — PROGRESS NOTES
[facility]  Wound Care  Progress Note    Problem List Items Addressed This Visit       Ulcer of left pretibial region with fat layer exposed - Primary    Overview   Patient with sudden development of ulceration on the left lower leg.  There has been no associated pain with limited drainage.  Patient has a history of venous stasis and chronic venous hypertension of the left lower extremity.  He has had prior venous ulcerations.         Current Assessment & Plan   Wound slowly improving.  Granulation tissue present.  Still moderate amount of nonviable tissue and slough.  Edema well controlled.  Continue sharp debridement is needed.  Continue Santyl for enzymatic debridement.  Continue compression            See wound doc progress notes. Documents will be scanned.        Travis Galvan  [facility]

## 2025-06-30 NOTE — ASSESSMENT & PLAN NOTE
Wound slowly improving.  Granulation tissue present.  Still moderate amount of nonviable tissue and slough.  Edema well controlled.  Continue sharp debridement is needed.  Continue Santyl for enzymatic debridement.  Continue compression

## 2025-07-07 ENCOUNTER — OFFICE VISIT (OUTPATIENT)
Dept: WOUND CARE | Facility: HOSPITAL | Age: 85
End: 2025-07-07
Attending: SURGERY
Payer: MEDICARE

## 2025-07-07 VITALS
HEART RATE: 75 BPM | TEMPERATURE: 99 F | RESPIRATION RATE: 17 BRPM | SYSTOLIC BLOOD PRESSURE: 126 MMHG | DIASTOLIC BLOOD PRESSURE: 78 MMHG

## 2025-07-07 DIAGNOSIS — L97.822 ULCER OF LEFT PRETIBIAL REGION WITH FAT LAYER EXPOSED: Primary | ICD-10-CM

## 2025-07-07 PROCEDURE — 11042 DBRDMT SUBQ TIS 1ST 20SQCM/<: CPT

## 2025-07-07 PROCEDURE — 99499 UNLISTED E&M SERVICE: CPT | Mod: ,,, | Performed by: SURGERY

## 2025-07-07 PROCEDURE — 88305 TISSUE EXAM BY PATHOLOGIST: CPT | Mod: TC | Performed by: SURGERY

## 2025-07-07 NOTE — ASSESSMENT & PLAN NOTE
Some Granulation tissue present. Still moderate amount of nonviable tissue and slough. Edema controlled. Continue sharp debridement is needed. Continue Santyl for enzymatic debridement. Continue compression.  I did do a tissue biopsy of the edge of the ulcer including normal skin as well as the ulcer itself.  I just want to make sure that we are not dealing with any type of a malignancy.

## 2025-07-07 NOTE — PROGRESS NOTES
Javier Odom is a 85 y.o. male patient.            Wound tissue biopsy    Date/Time: 7/7/2025 10:17 AM    Performed by: Timothy Cline Jr., MD  Authorized by: Timothy Cline Jr., MD  Preparation: Patient was prepped and draped in the usual sterile fashion.  Local anesthesia used: yes  Anesthesia: local infiltration    Anesthesia:  Local anesthesia used: yes  Local Anesthetic: lidocaine 1% with epinephrine  Anesthetic total (ml): 2.    Sedation:  Patient sedated: no    Patient tolerance: patient tolerated the procedure well with no immediate complications  Comments: Patient with chronic ulcer wound to the left lower leg over the shin region.  Eye prepped with Betadine and injected 1% lidocaine with epinephrine.  Pickup and scalpel was used to remove the edge of the wound including normal skin as well as wound edge and some of the actual wound and ulcer itself.  This will be sent off in formalin to pathology to exclude malignancy.  Bleeding was stopped with silver nitrate.      7/7/2025

## 2025-07-07 NOTE — PROGRESS NOTES
[facility]  Wound Care  Progress Note    Problem List Items Addressed This Visit       Ulcer of left pretibial region with fat layer exposed - Primary    Overview   Patient with sudden development of ulceration on the left lower leg.  There has been no associated pain with limited drainage.  Patient has a history of venous stasis and chronic venous hypertension of the left lower extremity.  He has had prior venous ulcerations.         Current Assessment & Plan   Some Granulation tissue present. Still moderate amount of nonviable tissue and slough. Edema controlled. Continue sharp debridement is needed. Continue Santyl for enzymatic debridement. Continue compression.  I did do a tissue biopsy of the edge of the ulcer including normal skin as well as the ulcer itself.  I just want to make sure that we are not dealing with any type of a malignancy.           Relevant Orders    Wound tissue biopsy       See wound doc progress notes. Documents will be scanned.        Timothy Cline Jr  [facility]

## 2025-07-09 LAB
ESTROGEN SERPL-MCNC: NORMAL PG/ML
INSULIN SERPL-ACNC: NORMAL U[IU]/ML
LAB AP GROSS DESCRIPTION: NORMAL
LAB AP PERFORMING LOCATION(S): NORMAL
LAB AP REPORT FOOTNOTES: NORMAL

## 2025-07-14 ENCOUNTER — OFFICE VISIT (OUTPATIENT)
Dept: WOUND CARE | Facility: HOSPITAL | Age: 85
End: 2025-07-14
Attending: SURGERY
Payer: MEDICARE

## 2025-07-14 VITALS
SYSTOLIC BLOOD PRESSURE: 125 MMHG | TEMPERATURE: 98 F | RESPIRATION RATE: 18 BRPM | DIASTOLIC BLOOD PRESSURE: 72 MMHG | HEART RATE: 73 BPM

## 2025-07-14 DIAGNOSIS — L97.823: ICD-10-CM

## 2025-07-14 DIAGNOSIS — I87.332 CHRONIC VENOUS HYPERTENSION (IDIOPATHIC) WITH ULCER AND INFLAMMATION OF LEFT LOWER EXTREMITY: Primary | ICD-10-CM

## 2025-07-14 PROCEDURE — 99499 UNLISTED E&M SERVICE: CPT | Mod: ,,, | Performed by: SURGERY

## 2025-07-14 PROCEDURE — 11043 DBRDMT MUSC&/FSCA 1ST 20/<: CPT

## 2025-07-14 NOTE — ASSESSMENT & PLAN NOTE
Patient complains of pain in the wound.    The wound has enlarged.  The superior 2/3 of the wound have a good character with excellent granulation tissue and pink with limited slough.  The inferior 3rd of the wound is nonviable without granulation tissue.    After lidocaine injection, excisional debridement was performed into fascia.  Necrotic tissue was removed.  Continue Santyl.

## 2025-07-14 NOTE — PROGRESS NOTES
[facility]  Wound Care  Progress Note    Problem List Items Addressed This Visit          Orthopedic    Chronic venous hypertension (idiopathic) with ulcer and inflammation of left lower extremity - Primary    Overview   HPI:  Patient with long-time history of chronic venous hypertension and prior venous ulcerations.  He has utilized stockings in the past.  Patient underwent venous ablation with Dr. Barnes of left leg 12/18/19.    Location: left lateral anterior lower leg    Duration:  3 weeks    Context: venous    Associated Signs & Symptoms: denies pain    Timing: n/a    Severity: n/a    Quality: n/a     Modifying Factors:  Type 2 diabetes  Patient is SYLVIA on the left leg was 0.89.  Angiogram was performed of the left lower extremity.  There was no notable disease.  Intervention was not performed.             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  There has been no associated pain with limited drainage.  Patient has a history of venous stasis and chronic venous hypertension of the left lower extremity.  He has had prior venous ulcerations.  Wound is noted to extend into the pretibial fascia.  Patient underwent biopsy of the wound on 07/07/2025.  Biopsy showed benign skin and subcutaneous tissue.  There was associated acute and chronic inflammation.         Current Assessment & Plan   Patient complains of pain in the wound.    The wound has enlarged.  The superior 2/3 of the wound have a good character with excellent granulation tissue and pink with limited slough.  The inferior 3rd of the wound is nonviable without granulation tissue.    After lidocaine injection, excisional debridement was performed into fascia.  Necrotic tissue was removed.  Continue Santyl.            See wound doc progress notes. Documents will be scanned.        Arvin Crowe  [facility]

## 2025-07-24 ENCOUNTER — OFFICE VISIT (OUTPATIENT)
Dept: WOUND CARE | Facility: HOSPITAL | Age: 85
End: 2025-07-24
Attending: SURGERY
Payer: MEDICARE

## 2025-07-24 VITALS — DIASTOLIC BLOOD PRESSURE: 70 MMHG | HEART RATE: 70 BPM | SYSTOLIC BLOOD PRESSURE: 120 MMHG

## 2025-07-24 DIAGNOSIS — L97.823: Primary | ICD-10-CM

## 2025-07-24 PROCEDURE — 99499 UNLISTED E&M SERVICE: CPT | Mod: ,,, | Performed by: SURGERY

## 2025-07-24 PROCEDURE — 11042 DBRDMT SUBQ TIS 1ST 20SQCM/<: CPT

## 2025-07-24 NOTE — ASSESSMENT & PLAN NOTE
Wound remains painful.  There is a moderate amount of drainage along with periwound maceration.  No surrounding erythema.  Most of the wound is granulating.  There is a small area of necrotic tissue inferiorly.  Will change to silver alginate.  Continue debridement is needed.  Continue compression.

## 2025-07-24 NOTE — PROGRESS NOTES
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  There has been no associated pain with limited drainage.  Patient has a history of venous stasis and chronic venous hypertension of the left lower extremity.  He has had prior venous ulcerations.  Wound is noted to extend into the pretibial fascia.  Patient underwent biopsy of the wound on 07/07/2025.  Biopsy showed benign skin and subcutaneous tissue.  There was associated acute and chronic inflammation.         Current Assessment & Plan   Wound remains painful.  There is a moderate amount of drainage along with periwound maceration.  No surrounding erythema.  Most of the wound is granulating.  There is a small area of necrotic tissue inferiorly.  Will change to silver alginate.  Continue debridement is needed.  Continue compression.            See wound doc progress notes. Documents will be scanned.        Travis Galvan  [facility]

## 2025-07-25 NOTE — PROGRESS NOTES
See wound care assessment documentation in chart review. Scanned under media tab.     
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  Patient states the wound has been present for several weeks.  Patient says he has had previous ulcers on the right lower extremity.  Patient denies any previous history of a deep vein thrombosis.  Patient denies any lower extremity swelling. Patient says he has been putting over-the-counter cream on the wound for the past several weeks.  Patient denies any significant drainage.  He denies any significant pain fever or chills.  Patient gives a history of blockages in the lower extremities.  Patient says he had a stent placed in the right lower extremity February of 2019.  Patient does smoke daily.  Sharp debridement performed. Enluxtra and 2 layer compression.  SYLVIA is 0.93 on the left lower extremity. Based on his SYLVIA, arterial circulation is adequate for compression.         Current Assessment & Plan     Patient underwent evaluation as [facility] last week.  He states he had venous and arterial ultrasound performed.  Patient states he was started on Eliquis.  Unclear of the results of his vascular studies.  Will obtain studies from [facility].  Wound today is more macerated.  There is no sign of infection.  Edema is fairly well controlled.  Will implement silver alginate due to the increased drainage.  Will continue compression wrap.  Will review reports from [facility].               See wound doc progress notes. Documents will be scanned.        Travis Galvan  [facility]    
Medications/Neurovascular

## 2025-07-28 ENCOUNTER — OFFICE VISIT (OUTPATIENT)
Dept: WOUND CARE | Facility: HOSPITAL | Age: 85
End: 2025-07-28
Attending: SURGERY
Payer: MEDICARE

## 2025-07-28 VITALS
TEMPERATURE: 98 F | HEART RATE: 78 BPM | DIASTOLIC BLOOD PRESSURE: 67 MMHG | RESPIRATION RATE: 17 BRPM | SYSTOLIC BLOOD PRESSURE: 118 MMHG

## 2025-07-28 DIAGNOSIS — I87.332 CHRONIC VENOUS HYPERTENSION (IDIOPATHIC) WITH ULCER AND INFLAMMATION OF LEFT LOWER EXTREMITY: Primary | ICD-10-CM

## 2025-07-28 DIAGNOSIS — L97.822 ULCER OF LEFT PRETIBIAL REGION WITH FAT LAYER EXPOSED: ICD-10-CM

## 2025-07-28 PROCEDURE — 99214 OFFICE O/P EST MOD 30 MIN: CPT

## 2025-07-28 PROCEDURE — 99499 UNLISTED E&M SERVICE: CPT | Mod: ,,, | Performed by: SURGERY

## 2025-07-28 NOTE — PROGRESS NOTES
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  There has been no associated pain with limited drainage.  Patient has a history of venous stasis and chronic venous hypertension of the left lower extremity.  He has had prior venous ulcerations.  Wound is noted to extend into the pretibial fascia.  Patient underwent biopsy of the wound on 07/07/2025.  Biopsy showed benign skin and subcutaneous tissue.  There was associated acute and chronic inflammation.         Current Assessment & Plan   Wound remains painful. There is a moderate amount of drainage along with periwound maceration. No surrounding erythema. Most of the wound is granulating. There is a small area of necrotic tissue inferiorly. Will change to silver alginate.  Patient refused debridement today.  Continue compression.             See wound doc progress notes. Documents will be scanned.        Timothy Cline Jr  [facility]

## 2025-07-28 NOTE — ASSESSMENT & PLAN NOTE
Wound remains painful. There is a moderate amount of drainage along with periwound maceration. No surrounding erythema. Most of the wound is granulating. There is a small area of necrotic tissue inferiorly. Will change to silver alginate.  Patient refused debridement today.  Continue compression.

## 2025-08-11 ENCOUNTER — OFFICE VISIT (OUTPATIENT)
Dept: WOUND CARE | Facility: HOSPITAL | Age: 85
End: 2025-08-11
Attending: SURGERY
Payer: MEDICARE

## 2025-08-11 VITALS — SYSTOLIC BLOOD PRESSURE: 120 MMHG | DIASTOLIC BLOOD PRESSURE: 68 MMHG | HEART RATE: 78 BPM

## 2025-08-11 DIAGNOSIS — L97.823: Primary | ICD-10-CM

## 2025-08-11 DIAGNOSIS — I87.332 CHRONIC VENOUS HYPERTENSION (IDIOPATHIC) WITH ULCER AND INFLAMMATION OF LEFT LOWER EXTREMITY: ICD-10-CM

## 2025-08-11 PROCEDURE — 99499 UNLISTED E&M SERVICE: CPT | Mod: ,,, | Performed by: SURGERY

## 2025-08-11 PROCEDURE — 99214 OFFICE O/P EST MOD 30 MIN: CPT

## 2025-08-25 ENCOUNTER — OFFICE VISIT (OUTPATIENT)
Dept: WOUND CARE | Facility: HOSPITAL | Age: 85
End: 2025-08-25
Attending: SURGERY
Payer: MEDICARE

## 2025-08-25 VITALS
SYSTOLIC BLOOD PRESSURE: 121 MMHG | RESPIRATION RATE: 18 BRPM | HEART RATE: 81 BPM | TEMPERATURE: 99 F | DIASTOLIC BLOOD PRESSURE: 71 MMHG

## 2025-08-25 DIAGNOSIS — L97.823: Primary | ICD-10-CM

## 2025-08-25 DIAGNOSIS — I87.332 CHRONIC VENOUS HYPERTENSION (IDIOPATHIC) WITH ULCER AND INFLAMMATION OF LEFT LOWER EXTREMITY: ICD-10-CM

## 2025-08-25 PROCEDURE — 29581 APPL MULTLAYER CMPRN SYS LEG: CPT

## 2025-08-25 PROCEDURE — 99499 UNLISTED E&M SERVICE: CPT | Mod: ,,, | Performed by: SURGERY
